# Patient Record
Sex: FEMALE | Race: WHITE | Employment: OTHER | URBAN - METROPOLITAN AREA
[De-identification: names, ages, dates, MRNs, and addresses within clinical notes are randomized per-mention and may not be internally consistent; named-entity substitution may affect disease eponyms.]

---

## 2018-04-18 ENCOUNTER — ANESTHESIA EVENT (OUTPATIENT)
Dept: ENDOSCOPY | Age: 78
End: 2018-04-18
Payer: MEDICARE

## 2018-04-18 ENCOUNTER — HOSPITAL ENCOUNTER (OUTPATIENT)
Age: 78
Setting detail: OUTPATIENT SURGERY
Discharge: HOME | End: 2018-04-18
Attending: INTERNAL MEDICINE | Admitting: INTERNAL MEDICINE
Payer: MEDICARE

## 2018-04-18 ENCOUNTER — ANESTHESIA (OUTPATIENT)
Dept: ENDOSCOPY | Age: 78
End: 2018-04-18
Payer: MEDICARE

## 2018-04-18 VITALS — OXYGEN SATURATION: 99 % | SYSTOLIC BLOOD PRESSURE: 114 MMHG | DIASTOLIC BLOOD PRESSURE: 79 MMHG

## 2018-04-18 VITALS
WEIGHT: 115.13 LBS | RESPIRATION RATE: 16 BRPM | SYSTOLIC BLOOD PRESSURE: 120 MMHG | TEMPERATURE: 96.8 F | HEIGHT: 63 IN | HEART RATE: 63 BPM | DIASTOLIC BLOOD PRESSURE: 70 MMHG | BODY MASS INDEX: 20.4 KG/M2 | OXYGEN SATURATION: 95 %

## 2018-04-18 LAB
APTT: 31.4 SEC (ref 24.5–35.1)
INR BLD: 1
PROTHROMBIN TIME: 11.4 SEC (ref 9.3–12.4)

## 2018-04-18 PROCEDURE — C1773 RET DEV, INSERTABLE: HCPCS | Performed by: INTERNAL MEDICINE

## 2018-04-18 PROCEDURE — 2580000003 HC RX 258: Performed by: ANESTHESIOLOGY

## 2018-04-18 PROCEDURE — 7100000010 HC PHASE II RECOVERY - FIRST 15 MIN: Performed by: INTERNAL MEDICINE

## 2018-04-18 PROCEDURE — 88342 IMHCHEM/IMCYTCHM 1ST ANTB: CPT

## 2018-04-18 PROCEDURE — 36415 COLL VENOUS BLD VENIPUNCTURE: CPT

## 2018-04-18 PROCEDURE — 3700000001 HC ADD 15 MINUTES (ANESTHESIA): Performed by: INTERNAL MEDICINE

## 2018-04-18 PROCEDURE — 7100000011 HC PHASE II RECOVERY - ADDTL 15 MIN: Performed by: INTERNAL MEDICINE

## 2018-04-18 PROCEDURE — 85730 THROMBOPLASTIN TIME PARTIAL: CPT

## 2018-04-18 PROCEDURE — 3609012400 HC EGD TRANSORAL BIOPSY SINGLE/MULTIPLE: Performed by: INTERNAL MEDICINE

## 2018-04-18 PROCEDURE — 85610 PROTHROMBIN TIME: CPT

## 2018-04-18 PROCEDURE — 2709999900 HC NON-CHARGEABLE SUPPLY: Performed by: INTERNAL MEDICINE

## 2018-04-18 PROCEDURE — 88305 TISSUE EXAM BY PATHOLOGIST: CPT

## 2018-04-18 PROCEDURE — 6360000002 HC RX W HCPCS: Performed by: NURSE ANESTHETIST, CERTIFIED REGISTERED

## 2018-04-18 PROCEDURE — 3700000000 HC ANESTHESIA ATTENDED CARE: Performed by: INTERNAL MEDICINE

## 2018-04-18 PROCEDURE — 3609027000 HC COLONOSCOPY: Performed by: INTERNAL MEDICINE

## 2018-04-18 RX ORDER — SODIUM CHLORIDE 0.9 % (FLUSH) 0.9 %
10 SYRINGE (ML) INJECTION PRN
Status: DISCONTINUED | OUTPATIENT
Start: 2018-04-18 | End: 2018-04-18 | Stop reason: HOSPADM

## 2018-04-18 RX ORDER — PROPOFOL 10 MG/ML
INJECTION, EMULSION INTRAVENOUS PRN
Status: DISCONTINUED | OUTPATIENT
Start: 2018-04-18 | End: 2018-04-18 | Stop reason: SDUPTHER

## 2018-04-18 RX ORDER — SODIUM CHLORIDE, SODIUM LACTATE, POTASSIUM CHLORIDE, CALCIUM CHLORIDE 600; 310; 30; 20 MG/100ML; MG/100ML; MG/100ML; MG/100ML
INJECTION, SOLUTION INTRAVENOUS CONTINUOUS
Status: DISCONTINUED | OUTPATIENT
Start: 2018-04-18 | End: 2018-04-18 | Stop reason: HOSPADM

## 2018-04-18 RX ORDER — SODIUM CHLORIDE 0.9 % (FLUSH) 0.9 %
10 SYRINGE (ML) INJECTION EVERY 12 HOURS SCHEDULED
Status: DISCONTINUED | OUTPATIENT
Start: 2018-04-18 | End: 2018-04-18 | Stop reason: HOSPADM

## 2018-04-18 RX ADMIN — SODIUM CHLORIDE, POTASSIUM CHLORIDE, SODIUM LACTATE AND CALCIUM CHLORIDE: 600; 310; 30; 20 INJECTION, SOLUTION INTRAVENOUS at 08:01

## 2018-04-18 RX ADMIN — PROPOFOL 350 MG: 10 INJECTION, EMULSION INTRAVENOUS at 09:36

## 2018-04-18 ASSESSMENT — PAIN - FUNCTIONAL ASSESSMENT: PAIN_FUNCTIONAL_ASSESSMENT: 0-10

## 2018-04-18 ASSESSMENT — PAIN SCALES - GENERAL
PAINLEVEL_OUTOF10: 0
PAINLEVEL_OUTOF10: 0

## 2018-09-06 ENCOUNTER — HOSPITAL ENCOUNTER (OUTPATIENT)
Age: 78
Discharge: HOME | End: 2018-09-08
Payer: MEDICARE

## 2018-09-06 ENCOUNTER — HOSPITAL ENCOUNTER (OUTPATIENT)
Dept: GENERAL RADIOLOGY | Age: 78
Discharge: HOME | End: 2018-09-08
Payer: MEDICARE

## 2018-09-06 DIAGNOSIS — M54.2 NECK PAIN: ICD-10-CM

## 2018-09-06 PROCEDURE — 72050 X-RAY EXAM NECK SPINE 4/5VWS: CPT

## 2019-06-03 NOTE — H&P
[address]                              HISTORY AND PHYSICAL    PATIENT NAME: Yessenia Najera                     :        1940  MED REC NO:   [id number]                            ROOM:  ACCOUNT NO:   [de-identified]                           ADMIT DATE: 2019  PROVIDER:     Marquita Hwang MD    CHIEF COMPLAINT:  Gastroesophageal reflux. HISTORY OF PRESENT ILLNESS:  The patient is an 33-year-old. Presents  with persistent gastroesophageal reflux symptoms, dysphagia on  occasions. ALLERGIES:  SULFA, TYLENOL NO. 3.    HABITS:  Denies. CURRENT MEDICATIONS:  Amlodipine 5 mg daily, lisinopril 10 mg daily,  warfarin 5 mg daily, sertraline 25 mg daily, ibuprofen daily, _____  daily. PAST MEDICAL HISTORY:  Hypertension, remote DVT and pulmonary emboli,  and colorectal cancer. PAST SURGICAL HISTORY:  Colectomy, laparoscopic Nissen fundoplication,  and hysterectomy. PHYSICAL EXAMINATION:  GENERAL:  Alert and oriented. VITAL SIGNS:  Weight 114 pounds, /82, pulse 72 and regular,  respirations 16, temperature of 97.1. HEENT:  Oral cavity is negative. NECK:  Negative. The peripheral lymph nodes nonpalpable. LUNGS:  Clear to auscultation. CARDIOVASCULAR:  Heart sounds are regular. ABDOMEN:  Soft. No palpable masses. BREASTS:  Deferred. RECTAL AND GENITALIA:  Deferred. EXTREMITIES:  Negative. NEUROLOGIC:  Oriented to time and space. No gross deficit. IMPRESSION:  Gastroesophageal reflux status post laparoscopic Nissen  fundoplication. PLAN:  EGD, possible esophageal dilatation.         Subhash Velez MD    D: 2019 7:16:24       T: 2019 9:17:55     [email or web address]  Job#: [id number]     Doc#: [id number]  CC:

## 2019-06-04 ENCOUNTER — HOSPITAL ENCOUNTER (OUTPATIENT)
Dept: MAMMOGRAPHY | Age: 79
Discharge: HOME | End: 2019-06-06
Payer: MEDICARE

## 2019-06-04 DIAGNOSIS — Z12.31 SCREENING MAMMOGRAM, ENCOUNTER FOR: ICD-10-CM

## 2019-06-04 PROCEDURE — 77067 SCR MAMMO BI INCL CAD: CPT

## 2019-06-11 NOTE — PROGRESS NOTES
[facility]                                                                                                                    PRE OP INSTRUCTIONS FOR  Berkley Silver        Date: 6/11/2019    Date of surgery: 6/13/19   Arrival Time: Hospital will call you between 5pm and 7pm with your final arrival time for surgery    1. Do not eat or drink anything after midnight prior to surgery. This includes no water, chewing gum, mints or ice chips. 2. Take the following medications with a small sip of water on the morning of Surgery: amlodipine     3. Diabetics may take evening dose of insulin but none after midnight. If you feel symptomatic or low blood sugar morning of surgery drink 1-2 ounces of apple juice only. 4. Aspirin, Ibuprofen, Advil, Naproxen, Vitamin E and other Anti-inflammatory products should be stopped  before surgery  as directed by your physician. Take Tylenol only unless instructed otherwise by your surgeon. 5. Check with your Doctor regarding stopping Plavix, Coumadin, Lovenox, Eliquis, Effient, or other blood thinners. 6. Do not smoke,use illicit drugs and do not drink any alcoholic beverages 24 hours prior to surgery. 7. You may brush your teeth the morning of surgery. DO NOT SWALLOW WATER    8. You MUST make arrangements for a responsible adult to take you home after your surgery. You will not be allowed to leave alone or drive yourself home. It is strongly suggested someone stay with you the first 24 hrs. Your surgery will be cancelled if you do not have a ride home. 9. PEDIATRIC PATIENTS ONLY:  A parent/legal guardian must accompany a child scheduled for surgery and plan to stay at the hospital until the child is discharged. Please do not bring other children with you.     10. Please wear simple, loose fitting clothing to the hospital.  Ling Gutierrez not bring valuables (money, credit cards, checkbooks, etc.) Do not wear any makeup (including no eye makeup) or nail polish on your fingers or toes. 11. DO NOT wear any jewelry or piercings on day of surgery. All body piercing jewelry must be removed. 12. Shower the night before surgery with _x__Antibacterial soap /JUDSON WIPES________    13. TOTAL JOINT REPLACEMENT/HYSTERECTOMY PATIENTS ONLY---Remember to bring Blood Bank bracelet to the hospital on the day of surgery. 14. If you have a Living Will and Durable Power of  for Healthcare, please bring in a copy. 15. If appropriate bring crutches, inspirex, WALKER, CANE etc... 12. Notify your Surgeon if you develop any illness between now and surgery time, cough, cold, fever, sore throat, nausea, vomiting, etc.  Please notify your surgeon if you experience dizziness, shortness of breath or blurred vision between now & the time of your surgery. 17. If you have ___dentures, they will be removed before going to the OR; we will provide you a container. If you wear ___contact lenses or _x__glasses, they will be removed; please bring a case for them. 18. To provide excellent care visitors will be limited to 2 in the room at any given time. 19. Please bring picture ID and insurance card. 20. Sleep apnea patients need to bring CPAP AND SETTINGS to hospital on day of surgery. 21. During flu season no children under the age of 15 are permitted in the hospital for the safety of all patients. 22. Other                  Please call AMBULATORY CARE if you have any further questions.    [address]     [address]

## 2019-06-13 ENCOUNTER — ANESTHESIA (OUTPATIENT)
Dept: ENDOSCOPY | Age: 79
End: 2019-06-13
Payer: MEDICARE

## 2019-06-13 ENCOUNTER — ANESTHESIA EVENT (OUTPATIENT)
Dept: ENDOSCOPY | Age: 79
End: 2019-06-13
Payer: MEDICARE

## 2019-06-13 ENCOUNTER — HOSPITAL ENCOUNTER (OUTPATIENT)
Age: 79
Setting detail: OUTPATIENT SURGERY
Discharge: HOME | End: 2019-06-13
Attending: INTERNAL MEDICINE | Admitting: INTERNAL MEDICINE
Payer: MEDICARE

## 2019-06-13 VITALS
RESPIRATION RATE: 16 BRPM | OXYGEN SATURATION: 96 % | BODY MASS INDEX: 20.02 KG/M2 | HEIGHT: 63 IN | DIASTOLIC BLOOD PRESSURE: 82 MMHG | SYSTOLIC BLOOD PRESSURE: 146 MMHG | WEIGHT: 113 LBS | TEMPERATURE: 97.9 F | HEART RATE: 60 BPM

## 2019-06-13 VITALS — SYSTOLIC BLOOD PRESSURE: 140 MMHG | OXYGEN SATURATION: 99 % | DIASTOLIC BLOOD PRESSURE: 86 MMHG

## 2019-06-13 LAB
APTT: 36.9 SEC (ref 24.5–35.1)
INR BLD: 1
PROTHROMBIN TIME: 11.7 SEC (ref 9.3–12.4)

## 2019-06-13 PROCEDURE — 3609012400 HC EGD TRANSORAL BIOPSY SINGLE/MULTIPLE: Performed by: INTERNAL MEDICINE

## 2019-06-13 PROCEDURE — 7100000010 HC PHASE II RECOVERY - FIRST 15 MIN: Performed by: INTERNAL MEDICINE

## 2019-06-13 PROCEDURE — 88312 SPECIAL STAINS GROUP 1: CPT

## 2019-06-13 PROCEDURE — 2709999900 HC NON-CHARGEABLE SUPPLY: Performed by: INTERNAL MEDICINE

## 2019-06-13 PROCEDURE — 85730 THROMBOPLASTIN TIME PARTIAL: CPT

## 2019-06-13 PROCEDURE — 85610 PROTHROMBIN TIME: CPT

## 2019-06-13 PROCEDURE — 36415 COLL VENOUS BLD VENIPUNCTURE: CPT

## 2019-06-13 PROCEDURE — 88305 TISSUE EXAM BY PATHOLOGIST: CPT

## 2019-06-13 PROCEDURE — 2580000003 HC RX 258: Performed by: ANESTHESIOLOGY

## 2019-06-13 PROCEDURE — 3700000000 HC ANESTHESIA ATTENDED CARE: Performed by: INTERNAL MEDICINE

## 2019-06-13 PROCEDURE — 6360000002 HC RX W HCPCS

## 2019-06-13 PROCEDURE — 7100000011 HC PHASE II RECOVERY - ADDTL 15 MIN: Performed by: INTERNAL MEDICINE

## 2019-06-13 RX ORDER — 0.9 % SODIUM CHLORIDE 0.9 %
10 VIAL (ML) INJECTION PRN
Status: DISCONTINUED | OUTPATIENT
Start: 2019-06-13 | End: 2019-06-13 | Stop reason: HOSPADM

## 2019-06-13 RX ORDER — SODIUM CHLORIDE 0.9 % (FLUSH) 0.9 %
10 SYRINGE (ML) INJECTION EVERY 12 HOURS SCHEDULED
Status: DISCONTINUED | OUTPATIENT
Start: 2019-06-13 | End: 2019-06-13 | Stop reason: HOSPADM

## 2019-06-13 RX ORDER — SODIUM CHLORIDE, SODIUM LACTATE, POTASSIUM CHLORIDE, CALCIUM CHLORIDE 600; 310; 30; 20 MG/100ML; MG/100ML; MG/100ML; MG/100ML
INJECTION, SOLUTION INTRAVENOUS CONTINUOUS
Status: DISCONTINUED | OUTPATIENT
Start: 2019-06-13 | End: 2019-06-13 | Stop reason: HOSPADM

## 2019-06-13 RX ORDER — PROPOFOL 10 MG/ML
INJECTION, EMULSION INTRAVENOUS PRN
Status: DISCONTINUED | OUTPATIENT
Start: 2019-06-13 | End: 2019-06-13 | Stop reason: SDUPTHER

## 2019-06-13 RX ADMIN — SODIUM CHLORIDE, POTASSIUM CHLORIDE, SODIUM LACTATE AND CALCIUM CHLORIDE: 600; 310; 30; 20 INJECTION, SOLUTION INTRAVENOUS at 10:34

## 2019-06-13 RX ADMIN — SODIUM CHLORIDE, POTASSIUM CHLORIDE, SODIUM LACTATE AND CALCIUM CHLORIDE: 600; 310; 30; 20 INJECTION, SOLUTION INTRAVENOUS at 10:13

## 2019-06-13 RX ADMIN — PROPOFOL 100 MG: 10 INJECTION, EMULSION INTRAVENOUS at 10:44

## 2019-06-13 ASSESSMENT — PAIN SCALES - GENERAL
PAINLEVEL_OUTOF10: 0
PAINLEVEL_OUTOF10: 0

## 2019-06-13 ASSESSMENT — LIFESTYLE VARIABLES: SMOKING_STATUS: 1

## 2019-06-13 ASSESSMENT — PAIN - FUNCTIONAL ASSESSMENT: PAIN_FUNCTIONAL_ASSESSMENT: 0-10

## 2019-06-13 NOTE — ANESTHESIA PRE PROCEDURE
Department of Anesthesiology  Preprocedure Note       Name:  Johnora Officer   Age:  78 y.o.  :  1940                                          MRN:  [id number]         Date:  2019      Surgeon: Oralia Cat):  Blaise aSnchez MD    Procedure: Procedure(s):  EGD ESOPHAGOGASTRODUODENOSCOPY  COLONOSCOPY    Medications prior to admission:   Prior to Admission medications    Medication Sig Start Date End Date Taking? Authorizing Provider   warfarin (COUMADIN) 5 MG tablet Take 5 mg by mouth 16  Yes Historical Provider, MD   lisinopril (PRINIVIL;ZESTRIL) 20 MG tablet Take 1 tablet by mouth daily  Patient taking differently: Take 10 mg by mouth daily  16  Yes Valentina Granados DO   amLODIPine (NORVASC) 10 MG tablet Take 1 tablet by mouth daily  Patient taking differently: Take 5 mg by mouth daily  16  Yes Valentina Granados DO   VITAMIN A Take 2 tablets by mouth daily. Yes Historical Provider, MD       Current medications:    Current Facility-Administered Medications   Medication Dose Route Frequency Provider Last Rate Last Dose    sodium chloride flush 0.9 % injection 10 mL  10 mL Intravenous 2 times per day Blaise Sanchez MD        sodium chloride (PF) 0.9 % injection 10 mL  10 mL Intravenous PRN Blaise Sanchez MD        lactated ringers infusion   Intravenous Continuous Linda Ford MD           Allergies:     Allergies   Allergen Reactions    Acetaminophen-Codeine Itching    Hydrocodone Nausea Only    Sulfa Antibiotics Rash       Problem List:    Patient Active Problem List   Diagnosis Code    Pulmonary embolism (HCC) I26.99    HTN (hypertension) I10    Hyperlipidemia E78.5    GERD (gastroesophageal reflux disease) K21.9    Colon cancer ([facility] [address] C18.9    Hiatal hernia K44.9       Past Medical History:        Diagnosis Date    Anesthesia     with rectal repair had spinal heart stopped    Anesthesia complication     no spinals heart stopped    Arthritis     Cancer ([facility] [address]     colon  COPD (chronic obstructive pulmonary disease) (HCC)     GERD (gastroesophageal reflux disease)     H/O factor V Leiden mutation     on Coumadin    Hiatal hernia     Hx of blood clots     Hx of mammogram     probably last year per patient    Hyperlipidemia     Hypertension     Phlebitis     Pulmonary embolism ([address]        Past Surgical History:        Procedure Laterality Date    ABDOMEN SURGERY      colon resection    APPENDECTOMY      BREAST SURGERY      biopsies on both breasts     COLONOSCOPY  2012    Dr Ollie Jenkins    COLONOSCOPY  10/9/13    COLONOSCOPY  2/19/15    2 polyps ascending colon    ENDOSCOPY, COLON, DIAGNOSTIC      EYE SURGERY      cataracts both eyes    HEMORRHOID SURGERY      HERNIA REPAIR      HYSTERECTOMY      OTHER SURGICAL HISTORY  10/14/2016    laparoscopic paraesophageal hernia repair with fundiplication    IN COLONOSCOPY FLX DX W/COLLJ SPEC WHEN PFRMD N/A 4/18/2018    COLONOSCOPY performed by Dayan Diaz MD at [place] [address]      right shoulder    UPPER GASTROINTESTINAL ENDOSCOPY      Dr. Ollie Jenkins    UPPER GASTROINTESTINAL ENDOSCOPY  02/01/2017    Dr Britney Chopra N/A 4/18/2018    EGD BIOPSY performed by Dayan Diaz MD at [facility]         Social History:    Social History     Tobacco Use    Smoking status: Current Every Day Smoker     Packs/day: 0.25     Years: 50.00     Pack years: 12.50    Smokeless tobacco: Never Used    Tobacco comment: 1 pack per week   Substance Use Topics    Alcohol use:  No                                Ready to quit: Not Answered  Counseling given: Not Answered  Comment: 1 pack per week      Vital Signs (Current):   Vitals:    06/11/19 1428 06/13/19 0945   BP:  134/75   Pulse:  61   Resp:  16   Temp:  98.8 °F (37.1 °C)   TempSrc:  Temporal   SpO2:  97%   Weight: 115 lb (52.2 kg) 113 lb (51.3 kg) Height: 5' 3\" (1.6 m) 5' 3\" (1.6 m)                                              BP Readings from Last 3 Encounters:   06/13/19 134/75   04/18/18 120/70   04/18/18 114/79       NPO Status: Time of last liquid consumption: 2000                        Time of last solid consumption: 2000                        Date of last liquid consumption: 06/12/19                        Date of last solid food consumption: 06/12/19    BMI:   Wt Readings from Last 3 Encounters:   06/13/19 113 lb (51.3 kg)   04/18/18 115 lb 2 oz (52.2 kg)   01/17/18 115 lb (52.2 kg)     Body mass index is 20.02 kg/m². CBC:   Lab Results   Component Value Date    WBC 6.8 01/17/2018    RBC 4.74 01/17/2018    HGB 13.7 01/17/2018    HCT 41.9 01/17/2018    MCV 88.4 01/17/2018    RDW 15.3 01/17/2018     01/17/2018       CMP:   Lab Results   Component Value Date     01/17/2018    K 3.4 01/17/2018     01/17/2018    CO2 33 01/17/2018    BUN 23 01/17/2018    CREATININE 0.8 01/17/2018    GFRAA >60 01/17/2018    LABGLOM >60 01/17/2018    GLUCOSE 49 01/17/2018    PROT 5.2 10/16/2016    CALCIUM 9.8 01/17/2018    BILITOT 0.4 10/16/2016    ALKPHOS 59 10/16/2016    AST 35 10/16/2016    ALT 38 10/16/2016       POC Tests: No results for input(s): POCGLU, POCNA, POCK, POCCL, POCBUN, POCHEMO, POCHCT in the last 72 hours. Coags:   Lab Results   Component Value Date    PROTIME 11.4 04/18/2018    PROTIME 21.1 07/16/2015    INR 1.0 04/18/2018    APTT 31.4 04/18/2018       HCG (If Applicable): No results found for: PREGTESTUR, PREGSERUM, HCG, HCGQUANT     ABGs: No results found for: PHART, PO2ART, ZMN9XJN, RJP7GCV, BEART, J0DWZKAQ     Type & Screen (If Applicable):  No results found for: LABABO, [facility]    Anesthesia Evaluation  Patient summary reviewed   history of anesthetic complications (Cardiac arrest with spinals.): PONV.   Airway: Mallampati: II  TM distance: >3 FB   Neck ROM: full  Mouth opening: > = 3 FB Dental:          Pulmonary: breath sounds clear to auscultation  (+) COPD:  current smoker                          ROS comment: Current Smoker . 25 ppd     Cardiovascular:    (+) hypertension:, hyperlipidemia      ECG reviewed  Rhythm: regular  Rate: normal           Beta Blocker:  Not on Beta Blocker      ROS comment: EKG: Normal Sinus Rhythm 71. Neuro/Psych:               GI/Hepatic/Renal:   (+) hiatal hernia, GERD:,          ROS comment: Cancer Colon. .   Endo/Other:    (+) blood dyscrasia: Factor V and anticoagulation therapy:., malignancy/cancer ( Colon cancer ). Abdominal:         (-) obese     Vascular:   + PE.        ROS comment: H/O Phlebitis. .                                 Anesthesia Plan      MAC     ASA 3       Induction: intravenous. Anesthetic plan and risks discussed with patient and sibling. Plan discussed with CRNA. DOS STAFF ADDENDUM:    Pt seen and examined, chart reviewed (including anesthesia, drug and allergy history). Anesthetic plan, risks, benefits, alternatives, and personnel involved discussed with patient. Patient verbalized an understanding and agrees to proceed. Plan discussed with care team members and agreed upon.     Jenna Dunlap MD  Staff Anesthesiologist  9:59 AM    Jenna Dunlap MD   6/13/2019

## 2019-06-13 NOTE — ANESTHESIA POSTPROCEDURE EVALUATION
Department of Anesthesiology  Postprocedure Note    Patient: Neha Renteria  MRN: [id number]  YOB: 1940  Date of evaluation: 6/13/2019  Time:  1:05 PM     Procedure Summary     Date:  06/13/19 Room / Location:  [place] [address] / [facility]    Anesthesia Start:  1034 Anesthesia Stop:  1568    Procedure:  EGD BIOPSY (N/A ) Diagnosis:  (GERD)    Surgeon:  Lexus Frazier MD Responsible Provider:  Estrella Cates MD    Anesthesia Type:  MAC ASA Status:  3          Anesthesia Type: MAC    Jackie Phase I: Jackie Score: 10    Jackie Phase II: Jackie Score: 10    Last vitals: Reviewed and per EMR flowsheets.        Anesthesia Post Evaluation    Patient location during evaluation: PACU  Patient participation: complete - patient participated  Level of consciousness: awake and alert  Airway patency: patent  Nausea & Vomiting: no nausea and no vomiting  Complications: no  Cardiovascular status: hemodynamically stable  Respiratory status: acceptable  Hydration status: euvolemic

## 2019-06-13 NOTE — OP NOTE
[address]                                OPERATIVE REPORT    PATIENT NAME: Annie Nettles                   :        1940  MED REC NO:   [id number]                            ROOM:  ACCOUNT NO:   [de-identified]                           ADMIT DATE: 2019  PROVIDER:     Mic Argueta MD    DATE OF PROCEDURE:  2019    PREOPERATIVE DIAGNOSIS:  GERD. POSTOPERATIVE DIAGNOSIS:  GERD. OPERATION PERFORMED:  EGD. SURGEON:  Mic Argueta MD    INDICATIONS:  The patient is 64-year-old. Gastroesophageal reflux  symptoms. ASA classification II. Informed consent. SEDATION:  By Anesthesia. OPERATIVE PROCEDURE:  The Olympus video upper endoscope was introduced  through the mouth. The esophagus, stomach, and the proximal duodenum  were inspected. Exam of the fundus by retroflexion. Normal status post  fundoplication. There is columnar epithelium in the lower esophagus (4  cm), biopsy. Endoscope withdrawn.         Renetta Whitmore MD    D: 2019 10:50:38       T: 2019 13:55:46     [email or web address]  Job#: [id number]     Doc#: [id number]    CC:  Michael Gaspar DO

## 2019-06-13 NOTE — H&P
H&p reviewed. No changes. Blood pressure 134/75, pulse 61, temperature 98.8 °F (37.1 °C), temperature source Temporal, resp. rate 16, height 5' 3\" (1.6 m), weight 113 lb (51.3 kg), SpO2 97 %, not currently breastfeeding.

## 2019-07-20 ENCOUNTER — APPOINTMENT (OUTPATIENT)
Dept: GENERAL RADIOLOGY | Age: 79
End: 2019-07-20
Payer: MEDICARE

## 2019-07-20 ENCOUNTER — APPOINTMENT (OUTPATIENT)
Dept: CT IMAGING | Age: 79
End: 2019-07-20
Payer: MEDICARE

## 2019-07-20 ENCOUNTER — HOSPITAL ENCOUNTER (EMERGENCY)
Age: 79
Discharge: HOME | End: 2019-07-20
Attending: EMERGENCY MEDICINE
Payer: MEDICARE

## 2019-07-20 VITALS
RESPIRATION RATE: 16 BRPM | SYSTOLIC BLOOD PRESSURE: 139 MMHG | BODY MASS INDEX: 19.49 KG/M2 | HEART RATE: 101 BPM | TEMPERATURE: 97.7 F | OXYGEN SATURATION: 97 % | WEIGHT: 110 LBS | HEIGHT: 63 IN | DIASTOLIC BLOOD PRESSURE: 89 MMHG

## 2019-07-20 DIAGNOSIS — R79.1 SUBTHERAPEUTIC INTERNATIONAL NORMALIZED RATIO (INR): Primary | ICD-10-CM

## 2019-07-20 DIAGNOSIS — E16.2 HYPOGLYCEMIA: ICD-10-CM

## 2019-07-20 DIAGNOSIS — R11.2 NON-INTRACTABLE VOMITING WITH NAUSEA, UNSPECIFIED VOMITING TYPE: ICD-10-CM

## 2019-07-20 LAB
ANION GAP SERPL CALCULATED.3IONS-SCNC: 11 MMOL/L (ref 7–16)
BASOPHILS ABSOLUTE: 0.08 E9/L (ref 0–0.2)
BASOPHILS RELATIVE PERCENT: 1.1 % (ref 0–2)
BILIRUBIN URINE: NEGATIVE
BLOOD, URINE: NEGATIVE
BUN BLDV-MCNC: 29 MG/DL (ref 8–23)
CALCIUM SERPL-MCNC: 11.1 MG/DL (ref 8.6–10.2)
CHLORIDE BLD-SCNC: 100 MMOL/L (ref 98–107)
CHP ED QC CHECK: YES
CHP ED QC CHECK: YES
CLARITY: CLEAR
CO2: 32 MMOL/L (ref 22–29)
COLOR: YELLOW
CREAT SERPL-MCNC: 1.1 MG/DL (ref 0.5–1)
EOSINOPHILS ABSOLUTE: 0.05 E9/L (ref 0.05–0.5)
EOSINOPHILS RELATIVE PERCENT: 0.7 % (ref 0–6)
GFR AFRICAN AMERICAN: 58
GFR NON-AFRICAN AMERICAN: 48 ML/MIN/1.73
GLUCOSE BLD-MCNC: 195 MG/DL
GLUCOSE BLD-MCNC: 195 MG/DL
GLUCOSE BLD-MCNC: 42 MG/DL (ref 74–99)
GLUCOSE URINE: NEGATIVE MG/DL
HCT VFR BLD CALC: 47.9 % (ref 34–48)
HEMOGLOBIN: 15.5 G/DL (ref 11.5–15.5)
IMMATURE GRANULOCYTES #: 0.01 E9/L
IMMATURE GRANULOCYTES %: 0.1 % (ref 0–5)
INR BLD: 1
KETONES, URINE: NEGATIVE MG/DL
LEUKOCYTE ESTERASE, URINE: NEGATIVE
LYMPHOCYTES ABSOLUTE: 1.33 E9/L (ref 1.5–4)
LYMPHOCYTES RELATIVE PERCENT: 18.8 % (ref 20–42)
MCH RBC QN AUTO: 29.8 PG (ref 26–35)
MCHC RBC AUTO-ENTMCNC: 32.4 % (ref 32–34.5)
MCV RBC AUTO: 91.9 FL (ref 80–99.9)
METER GLUCOSE: 195 MG/DL (ref 74–99)
MONOCYTES ABSOLUTE: 0.71 E9/L (ref 0.1–0.95)
MONOCYTES RELATIVE PERCENT: 10 % (ref 2–12)
NEUTROPHILS ABSOLUTE: 4.91 E9/L (ref 1.8–7.3)
NEUTROPHILS RELATIVE PERCENT: 69.3 % (ref 43–80)
NITRITE, URINE: NEGATIVE
PDW BLD-RTO: 14.6 FL (ref 11.5–15)
PH UA: 6 (ref 5–9)
PLATELET # BLD: 219 E9/L (ref 130–450)
PMV BLD AUTO: 11 FL (ref 7–12)
POTASSIUM SERPL-SCNC: 4.2 MMOL/L (ref 3.5–5)
PROTEIN UA: NEGATIVE MG/DL
PROTHROMBIN TIME: 11.5 SEC (ref 9.3–12.4)
RBC # BLD: 5.21 E12/L (ref 3.5–5.5)
SODIUM BLD-SCNC: 143 MMOL/L (ref 132–146)
SPECIFIC GRAVITY UA: 1.01 (ref 1–1.03)
TROPONIN: <0.01 NG/ML (ref 0–0.03)
UROBILINOGEN, URINE: 0.2 E.U./DL
WBC # BLD: 7.1 E9/L (ref 4.5–11.5)

## 2019-07-20 PROCEDURE — 71046 X-RAY EXAM CHEST 2 VIEWS: CPT

## 2019-07-20 PROCEDURE — 70450 CT HEAD/BRAIN W/O DYE: CPT

## 2019-07-20 PROCEDURE — 84484 ASSAY OF TROPONIN QUANT: CPT

## 2019-07-20 PROCEDURE — 81003 URINALYSIS AUTO W/O SCOPE: CPT

## 2019-07-20 PROCEDURE — 93005 ELECTROCARDIOGRAM TRACING: CPT | Performed by: PHYSICIAN ASSISTANT

## 2019-07-20 PROCEDURE — 85610 PROTHROMBIN TIME: CPT

## 2019-07-20 PROCEDURE — 82962 GLUCOSE BLOOD TEST: CPT

## 2019-07-20 PROCEDURE — 85025 COMPLETE CBC W/AUTO DIFF WBC: CPT

## 2019-07-20 PROCEDURE — 80048 BASIC METABOLIC PNL TOTAL CA: CPT

## 2019-07-20 PROCEDURE — 99284 EMERGENCY DEPT VISIT MOD MDM: CPT

## 2019-07-20 PROCEDURE — 36415 COLL VENOUS BLD VENIPUNCTURE: CPT

## 2019-07-20 RX ORDER — PREDNISONE 20 MG/1
60 TABLET ORAL ONCE
Status: DISCONTINUED | OUTPATIENT
Start: 2019-07-20 | End: 2019-07-20

## 2019-07-20 NOTE — ED PROVIDER NOTES
ED Attending  CC: No        HPI:  7/20/19,   Time: 11:45 AM         Emily Layne is a 78 y.o. female presenting to the ED for HA, nausea and request for PT/INR, beginning 2 days ago. The complaint has been persistent, mild in severity, and worsened by nothing. The patient presents today with several complaints. She states that she is mostly concerned about her PT/INR level. She states that on last check in early July it was a little bit low. She reports that they have been making adjustments in her dosage. She admits that she has been using ibuprofen off and on because of complaints of arthritis as well as a headache. She states that she had a severe headache 2 days ago that began out of the blue. There was no fall or trauma. She states that it is a little bit better now and at this point she has minimal pain. Patient denies any weakness in her arms or legs. She has intermittent dry heaves that she states have been an issue for years. She states that these began after she had surgery by Dr. Jasen Song several years ago. She admits that she does not even think she told him but she has discussed this with her family physician. The patient states that she takes Nexium or omeprazole as needed. The patient also told the triage nurse that she was having some fatigue and lightheadedness. In general she just has not felt well over the past few days. Patient denies any chest pain or shortness of breath.   She has had no emesis this morning and denies any abdominal pain or urinary symptoms        ROS:     Constitutional: Negative for fever and chills  HENT: Negative for ear pain, sore throat and sinus pressure  Eyes: Negative for pain, discharge and redness  Respiratory:  Negative for shortness of breath, cough and wheezing  Cardiovascular: Negative for CP, edema or palpitations  Gastrointestinal:  See HPI  Genitourinary: Negative for dysuria and frequency  Musculoskeletal: See HPI  Skin: Negative for rash WBC 7.1 4.5 - 11.5 E9/L    RBC 5.21 3.50 - 5.50 E12/L    Hemoglobin 15.5 11.5 - 15.5 g/dL    Hematocrit 47.9 34.0 - 48.0 %    MCV 91.9 80.0 - 99.9 fL    MCH 29.8 26.0 - 35.0 pg    MCHC 32.4 32.0 - 34.5 %    RDW 14.6 11.5 - 15.0 fL    Platelets 404 977 - 648 E9/L    MPV 11.0 7.0 - 12.0 fL    Neutrophils % 69.3 43.0 - 80.0 %    Immature Granulocytes % 0.1 0.0 - 5.0 %    Lymphocytes % 18.8 (L) 20.0 - 42.0 %    Monocytes % 10.0 2.0 - 12.0 %    Eosinophils % 0.7 0.0 - 6.0 %    Basophils % 1.1 0.0 - 2.0 %    Neutrophils # 4.91 1.80 - 7.30 E9/L    Immature Granulocytes # 0.01 E9/L    Lymphocytes # 1.33 (L) 1.50 - 4.00 E9/L    Monocytes # 0.71 0.10 - 0.95 E9/L    Eosinophils # 0.05 0.05 - 0.50 E9/L    Basophils # 0.08 0.00 - 0.20 M4/S   Basic Metabolic Panel   Result Value Ref Range    Sodium 143 132 - 146 mmol/L    Potassium 4.2 3.5 - 5.0 mmol/L    Chloride 100 98 - 107 mmol/L    CO2 32 (H) 22 - 29 mmol/L    Anion Gap 11 7 - 16 mmol/L    Glucose 42 (L) 74 - 99 mg/dL    BUN 29 (H) 8 - 23 mg/dL    CREATININE 1.1 (H) 0.5 - 1.0 mg/dL    GFR Non-African American 48 >=60 mL/min/1.73    GFR African American 58     Calcium 11.1 (H) 8.6 - 10.2 mg/dL   Protime-INR   Result Value Ref Range    Protime 11.5 9.3 - 12.4 sec    INR 1.0    Troponin   Result Value Ref Range    Troponin <0.01 0.00 - 0.03 ng/mL   Urinalysis   Result Value Ref Range    Color, UA Yellow Straw/Yellow    Clarity, UA Clear Clear    Glucose, Ur Negative Negative mg/dL    Bilirubin Urine Negative Negative    Ketones, Urine Negative Negative mg/dL    Specific Gravity, UA 1.015 1.005 - 1.030    Blood, Urine Negative Negative    pH, UA 6.0 5.0 - 9.0    Protein, UA Negative Negative mg/dL    Urobilinogen, Urine 0.2 <2.0 E.U./dL    Nitrite, Urine Negative Negative    Leukocyte Esterase, Urine Negative Negative   EKG 12 Lead   Result Value Ref Range    Ventricular Rate 105 BPM    Atrial Rate 105 BPM    P-R Interval 168 ms    QRS Duration 76 ms    Q-T Interval 344 ms QTc Calculation (Bazett) 454 ms    P Axis 59 degrees    R Axis -41 degrees    T Axis 71 degrees       RADIOLOGY:  Interpreted by Radiologist.  CT Head WO Contrast   Final Result      1. No acute findings. XR CHEST STANDARD (2 VW)   Final Result   1. No active cardiopulmonary disease.           ----------------- NURSING NOTES AND VITALS REVIEWED ---------------   The nursing notes within the ED encounter and vital signs as below have been reviewed. BP (!) 154/101   Pulse 82   Temp 97.7 °F (36.5 °C) (Oral)   Resp 14   Ht 5' 3\" (1.6 m)   Wt 110 lb (49.9 kg)   LMP  (LMP Unknown)   SpO2 97%   BMI 19.49 kg/m²   Oxygen Saturation Interpretation: Normal      --------------------------------PHYSICAL EXAM------------------------------------      Constitutional/General: Alert and oriented x3, well appearing, non toxic in NAD  Head: NC/AT  Eyes: PERRL, EOMI  Mouth: Oropharynx clear, handling secretions, no trismus  Neck: Supple, full ROM, no meningeal signs  Pulmonary: Lungs clear to auscultation bilaterally, no wheezes, rales, or rhonchi. Not in respiratory distress  Cardiovascular:  Regular rate and rhythm, no murmurs, gallops, or rubs. 2+ distal pulses  Abdomen: Soft, + BS. No distension. Nontender. No palpable rigidity, rebound or guarding  Extremities: Moves all extremities x 4. Warm and well perfused  Skin: warm and dry without rash  Neurologic: GCS 15,  Intact. No focal deficits  Psych: Normal Affect      ------------------------ ED COURSE/MEDICAL DECISION MAKING----------------------  Medications - No data to display      Medical Decision Making:    Work-up looks good. Her BS was low and we fed her here. INR subtherapeutic. She met with pharmacist and insisted she is taking her Coumadin daily as directed. She had broccoli several times this week. Reviewed proper warfarin diet restrictions. Plan discharge home with instructions to F/U as planned at [facility].   Seen and evaluated with

## 2019-07-21 LAB
EKG ATRIAL RATE: 105 BPM
EKG P AXIS: 59 DEGREES
EKG P-R INTERVAL: 168 MS
EKG Q-T INTERVAL: 344 MS
EKG QRS DURATION: 76 MS
EKG QTC CALCULATION (BAZETT): 454 MS
EKG R AXIS: -41 DEGREES
EKG T AXIS: 71 DEGREES
EKG VENTRICULAR RATE: 105 BPM

## 2019-07-21 PROCEDURE — 93010 ELECTROCARDIOGRAM REPORT: CPT | Performed by: INTERNAL MEDICINE

## 2020-12-14 NOTE — H&P
[address]                              HISTORY AND PHYSICAL    PATIENT NAME: Joslyn Mccormack                   :        1940  MED REC NO:   [id number]                            ROOM:  ACCOUNT NO:   [de-identified]                           ADMIT DATE: 2020  PROVIDER:     Rolando Alicia MD    CHIEF COMPLAINT:  Screening colonoscopy, dyspepsia. HISTORY OF PRESENT ILLNESS:  The patient is an 61-year-old. Screening  colonoscopy. Seven periodic loose stools. She had dyspeptic symptoms. ALLERGIES:  SULFA, TYLENOL NO. 3.    HABITS:  Denies. CURRENT MEDICATIONS:  Lisinopril 10 mg daily, amlodipine 5 mg daily,  Nexium 40 mg daily, multivitamin. PAST MEDICAL HISTORY:  Hypertension, colorectal cancer, remote DVT with  pulmonary embolism. PAST SURGICAL HISTORY:  Colectomy, laparoscopic Nissen, hysterectomy. PHYSICAL EXAMINATION:  GENERAL:  Alert and oriented. VITAL SIGNS:  Weight 118 pounds. /80, pulse 72, respirations 16,  temperature 98. 2. HEENT:  Oral cavity negative. NECK:  Negative. The peripheral lymph nodes are not palpable. LUNGS:  Clear to auscultation. BREASTS:  Deferred. HEART:  Sounds regular. ABDOMEN:  Soft. No palpable masses. RECTAL:  Negative. GENITALIA:  Deferred. EXTREMITIES:  Negative. NEUROLOGIC:  Oriented to time and space. No gross deficit. IMPRESSION:  1.  Screening colonoscopy. 2.  Dyspeptic symptoms. PLAN:  Colonoscopy, EGD.         Froylan Busby MD    D: 2020 13:55:06       T: 2020 14:01:42     [email or web address]  Job#: [id number]     Doc#: [id number]    CC:

## 2020-12-17 ENCOUNTER — HOSPITAL ENCOUNTER (OUTPATIENT)
Age: 80
Discharge: HOME | End: 2020-12-17
Payer: MEDICARE

## 2020-12-17 PROCEDURE — U0003 INFECTIOUS AGENT DETECTION BY NUCLEIC ACID (DNA OR RNA); SEVERE ACUTE RESPIRATORY SYNDROME CORONAVIRUS 2 (SARS-COV-2) (CORONAVIRUS DISEASE [COVID-19]), AMPLIFIED PROBE TECHNIQUE, MAKING USE OF HIGH THROUGHPUT TECHNOLOGIES AS DESCRIBED BY CMS-2020-01-R: HCPCS

## 2020-12-19 LAB
SARS-COV-2: NOT DETECTED
SOURCE: NORMAL

## 2020-12-22 RX ORDER — ESOMEPRAZOLE MAGNESIUM 20 MG/1
20 TABLET, DELAYED RELEASE ORAL 2 TIMES DAILY
COMMUNITY

## 2020-12-22 NOTE — PROGRESS NOTES
[facility]                                                                                                                    PRE OP INSTRUCTIONS FOR  Kofi Kaminski        Date: 12/22/2020    Date of surgery: 12/23/2020   Arrival Time: Hospital will call you between 5pm and 7pm with your final arrival time for surgery    1. Do not eat or drink anything after midnight prior to surgery. This includes no water, chewing gum, mints or ice chips. 2. Take the following medications with a small sip of water on the morning of Surgery: amlodipine     3. Diabetics may take evening dose of insulin but none after midnight. If you feel symptomatic or low blood sugar morning of surgery drink 1-2 ounces of apple juice only. 4. Aspirin, Ibuprofen, Advil, Naproxen, Vitamin E and other Anti-inflammatory products should be stopped  before surgery  as directed by your physician. Take Tylenol only unless instructed otherwise by your surgeon. 5. Check with your Doctor regarding stopping Plavix, Coumadin, Lovenox, Eliquis, Effient, or other blood thinners. 6. Do not smoke,use illicit drugs and do not drink any alcoholic beverages 24 hours prior to surgery. 7. You may brush your teeth the morning of surgery. DO NOT SWALLOW WATER    8. You MUST make arrangements for a responsible adult to take you home after your surgery. You will not be allowed to leave alone or drive yourself home. It is strongly suggested someone stay with you the first 24 hrs. Your surgery will be cancelled if you do not have a ride home. 9. PEDIATRIC PATIENTS ONLY:  A parent/legal guardian must accompany a child scheduled for surgery and plan to stay at the hospital until the child is discharged. Please do not bring other children with you.     10. Please wear simple, loose fitting clothing to the hospital.  [place] not bring valuables (money, credit cards, checkbooks, etc.) Do not wear any makeup (including no eye makeup) or nail polish on your fingers or toes. 11. DO NOT wear any jewelry or piercings on day of surgery. All body piercing jewelry must be removed. 12. Shower the night before surgery with ___Antibacterial soap /JUDSON WIPES________    13. TOTAL JOINT REPLACEMENT/HYSTERECTOMY PATIENTS ONLY---Remember to bring Blood Bank bracelet to the hospital on the day of surgery. 14. If you have a Living Will and Durable Power of  for Healthcare, please bring in a copy. 15. If appropriate bring crutches, inspirex, WALKER, CANE etc... 12. Notify your Surgeon if you develop any illness between now and surgery time, cough, cold, fever, sore throat, nausea, vomiting, etc.  Please notify your surgeon if you experience dizziness, shortness of breath or blurred vision between now & the time of your surgery. 17. If you have ___dentures, they will be removed before going to the OR; we will provide you a container. If you wear ___contact lenses or ___glasses, they will be removed; please bring a case for them. 18. To provide excellent care visitors will be limited to 2 in the room at any given time. 19. Please bring picture ID and insurance card. 20. Sleep apnea patients need to bring CPAP AND SETTINGS to hospital on day of surgery. 21. During flu season no children under the age of 15 are permitted in the hospital for the safety of all patients. 22. The day of your procedure please report to the main lobby information desk and you will be directed where to go. Please call AMBULATORY CARE if you have any further questions.    [address]     [address]

## 2020-12-23 ENCOUNTER — ANESTHESIA (OUTPATIENT)
Dept: ENDOSCOPY | Age: 80
End: 2020-12-23
Payer: MEDICARE

## 2020-12-23 ENCOUNTER — HOSPITAL ENCOUNTER (OUTPATIENT)
Age: 80
Setting detail: OUTPATIENT SURGERY
Discharge: HOME | End: 2020-12-23
Attending: INTERNAL MEDICINE | Admitting: INTERNAL MEDICINE
Payer: MEDICARE

## 2020-12-23 ENCOUNTER — ANESTHESIA EVENT (OUTPATIENT)
Dept: ENDOSCOPY | Age: 80
End: 2020-12-23
Payer: MEDICARE

## 2020-12-23 VITALS
SYSTOLIC BLOOD PRESSURE: 135 MMHG | WEIGHT: 112.31 LBS | OXYGEN SATURATION: 97 % | HEIGHT: 63 IN | TEMPERATURE: 97.9 F | HEART RATE: 60 BPM | DIASTOLIC BLOOD PRESSURE: 69 MMHG | RESPIRATION RATE: 16 BRPM | BODY MASS INDEX: 19.9 KG/M2

## 2020-12-23 VITALS
RESPIRATION RATE: 16 BRPM | DIASTOLIC BLOOD PRESSURE: 77 MMHG | OXYGEN SATURATION: 98 % | SYSTOLIC BLOOD PRESSURE: 107 MMHG

## 2020-12-23 PROCEDURE — 2580000003 HC RX 258: Performed by: ANESTHESIOLOGY

## 2020-12-23 PROCEDURE — 2500000003 HC RX 250 WO HCPCS: Performed by: ANESTHESIOLOGY

## 2020-12-23 PROCEDURE — 88305 TISSUE EXAM BY PATHOLOGIST: CPT

## 2020-12-23 PROCEDURE — 2709999900 HC NON-CHARGEABLE SUPPLY: Performed by: INTERNAL MEDICINE

## 2020-12-23 PROCEDURE — 7100000010 HC PHASE II RECOVERY - FIRST 15 MIN: Performed by: INTERNAL MEDICINE

## 2020-12-23 PROCEDURE — 3700000000 HC ANESTHESIA ATTENDED CARE: Performed by: INTERNAL MEDICINE

## 2020-12-23 PROCEDURE — 3609010400 HC COLONOSCOPY POLYPECTOMY HOT BIOPSY: Performed by: INTERNAL MEDICINE

## 2020-12-23 PROCEDURE — 3700000001 HC ADD 15 MINUTES (ANESTHESIA): Performed by: INTERNAL MEDICINE

## 2020-12-23 PROCEDURE — 6360000002 HC RX W HCPCS: Performed by: NURSE ANESTHETIST, CERTIFIED REGISTERED

## 2020-12-23 PROCEDURE — 3609012400 HC EGD TRANSORAL BIOPSY SINGLE/MULTIPLE: Performed by: INTERNAL MEDICINE

## 2020-12-23 PROCEDURE — 7100000011 HC PHASE II RECOVERY - ADDTL 15 MIN: Performed by: INTERNAL MEDICINE

## 2020-12-23 RX ORDER — SODIUM CHLORIDE 0.9 % (FLUSH) 0.9 %
10 SYRINGE (ML) INJECTION PRN
Status: DISCONTINUED | OUTPATIENT
Start: 2020-12-23 | End: 2020-12-23 | Stop reason: HOSPADM

## 2020-12-23 RX ORDER — SODIUM CHLORIDE, SODIUM LACTATE, POTASSIUM CHLORIDE, CALCIUM CHLORIDE 600; 310; 30; 20 MG/100ML; MG/100ML; MG/100ML; MG/100ML
INJECTION, SOLUTION INTRAVENOUS CONTINUOUS
Status: DISCONTINUED | OUTPATIENT
Start: 2020-12-23 | End: 2020-12-23 | Stop reason: HOSPADM

## 2020-12-23 RX ORDER — HYDROCODONE BITARTRATE AND ACETAMINOPHEN 5; 325 MG/1; MG/1
1 TABLET ORAL
Status: DISCONTINUED | OUTPATIENT
Start: 2020-12-23 | End: 2020-12-23 | Stop reason: HOSPADM

## 2020-12-23 RX ORDER — PROPOFOL 10 MG/ML
INJECTION, EMULSION INTRAVENOUS PRN
Status: DISCONTINUED | OUTPATIENT
Start: 2020-12-23 | End: 2020-12-23 | Stop reason: SDUPTHER

## 2020-12-23 RX ORDER — SODIUM CHLORIDE 0.9 % (FLUSH) 0.9 %
10 SYRINGE (ML) INJECTION EVERY 12 HOURS SCHEDULED
Status: DISCONTINUED | OUTPATIENT
Start: 2020-12-23 | End: 2020-12-23 | Stop reason: HOSPADM

## 2020-12-23 RX ADMIN — FAMOTIDINE 20 MG: 10 INJECTION INTRAVENOUS at 08:40

## 2020-12-23 RX ADMIN — PROPOFOL 25 MG: 10 INJECTION, EMULSION INTRAVENOUS at 09:24

## 2020-12-23 RX ADMIN — PROPOFOL 25 MG: 10 INJECTION, EMULSION INTRAVENOUS at 09:34

## 2020-12-23 RX ADMIN — PROPOFOL 25 MG: 10 INJECTION, EMULSION INTRAVENOUS at 09:17

## 2020-12-23 RX ADMIN — PROPOFOL 25 MG: 10 INJECTION, EMULSION INTRAVENOUS at 08:55

## 2020-12-23 RX ADMIN — PROPOFOL 25 MG: 10 INJECTION, EMULSION INTRAVENOUS at 09:10

## 2020-12-23 RX ADMIN — PROPOFOL 25 MG: 10 INJECTION, EMULSION INTRAVENOUS at 08:59

## 2020-12-23 RX ADMIN — PROPOFOL 25 MG: 10 INJECTION, EMULSION INTRAVENOUS at 08:56

## 2020-12-23 RX ADMIN — PROPOFOL 25 MG: 10 INJECTION, EMULSION INTRAVENOUS at 08:54

## 2020-12-23 RX ADMIN — SODIUM CHLORIDE, POTASSIUM CHLORIDE, SODIUM LACTATE AND CALCIUM CHLORIDE: 600; 310; 30; 20 INJECTION, SOLUTION INTRAVENOUS at 08:20

## 2020-12-23 RX ADMIN — PROPOFOL 25 MG: 10 INJECTION, EMULSION INTRAVENOUS at 09:37

## 2020-12-23 RX ADMIN — PROPOFOL 25 MG: 10 INJECTION, EMULSION INTRAVENOUS at 09:00

## 2020-12-23 RX ADMIN — PROPOFOL 25 MG: 10 INJECTION, EMULSION INTRAVENOUS at 09:27

## 2020-12-23 RX ADMIN — PROPOFOL 25 MG: 10 INJECTION, EMULSION INTRAVENOUS at 09:15

## 2020-12-23 RX ADMIN — PROPOFOL 25 MG: 10 INJECTION, EMULSION INTRAVENOUS at 09:12

## 2020-12-23 RX ADMIN — PROPOFOL 25 MG: 10 INJECTION, EMULSION INTRAVENOUS at 09:02

## 2020-12-23 RX ADMIN — PROPOFOL 25 MG: 10 INJECTION, EMULSION INTRAVENOUS at 09:06

## 2020-12-23 RX ADMIN — PROPOFOL 25 MG: 10 INJECTION, EMULSION INTRAVENOUS at 09:08

## 2020-12-23 RX ADMIN — PROPOFOL 25 MG: 10 INJECTION, EMULSION INTRAVENOUS at 09:21

## 2020-12-23 RX ADMIN — PROPOFOL 25 MG: 10 INJECTION, EMULSION INTRAVENOUS at 09:04

## 2020-12-23 RX ADMIN — PROPOFOL 25 MG: 10 INJECTION, EMULSION INTRAVENOUS at 09:31

## 2020-12-23 ASSESSMENT — COPD QUESTIONNAIRES: CAT_SEVERITY: MILD

## 2020-12-23 ASSESSMENT — PAIN SCALES - GENERAL
PAINLEVEL_OUTOF10: 0
PAINLEVEL_OUTOF10: 0

## 2020-12-23 ASSESSMENT — PAIN - FUNCTIONAL ASSESSMENT: PAIN_FUNCTIONAL_ASSESSMENT: 0-10

## 2020-12-23 NOTE — H&P
H&p reviewed. No changes. Blood pressure 134/69, pulse 69, temperature 97.9 °F (36.6 °C), temperature source Infrared, resp. rate 16, height 5' 3\" (1.6 m), weight 112 lb 5 oz (50.9 kg), SpO2 97 %, not currently breastfeeding. The patient was counseled at length about the risks of sol Covid-19 during their perioperative period and any recovery window from their procedure. The patient was made aware that sol Covid-19  may worsen their prognosis for recovering from their procedure  and lend to a higher morbidity and/or mortality risk. All material risks, benefits, and reasonable alternatives including postponing the procedure were discussed. The patient does wish to proceed with the procedure at this time.

## 2020-12-23 NOTE — ANESTHESIA POSTPROCEDURE EVALUATION
Department of Anesthesiology  Postprocedure Note    Patient: Benjamin Jorgensen  MRN: [id number]  YOB: 1940  Date of evaluation: 12/23/2020  Time:  10:03 AM     Procedure Summary     Date: 12/23/20 Room / Location: [address] [place] [address] 01 / [address] [place] [address]    Anesthesia Start: 0855 Anesthesia Stop: 4777    Procedures:       EGD BIOPSY (N/A )      COLONOSCOPY POLYPECTOMY HOT BIOPSY (N/A ) Diagnosis: (SCREENING, GERD)    Surgeons: Mary Anne Wolfe MD Responsible Provider: Lj Muller DO    Anesthesia Type: MAC ASA Status: 3          Anesthesia Type: MAC    Jackie Phase I: Jackie Score: 10    Jackie Phase II:      Last vitals: Reviewed and per EMR flowsheets.        Anesthesia Post Evaluation    Patient location during evaluation: bedside  Patient participation: complete - patient participated  Level of consciousness: awake  Pain score: 1  Airway patency: patent  Nausea & Vomiting: no vomiting and no nausea  Complications: no  Cardiovascular status: hemodynamically stable  Respiratory status: acceptable  Hydration status: stable

## 2020-12-23 NOTE — OP NOTE
[address]                                OPERATIVE REPORT    PATIENT NAME: Ej Franco                   :        1940  MED REC NO:   [id number]                            ROOM:  ACCOUNT NO:   [de-identified]                           ADMIT DATE: 2020  PROVIDER:     Nai Baker MD    DATE OF PROCEDURE:  2020    SURGEON:  Nai Baker MD    PREOPERATIVE DIAGNOSES:  Screening, GERD. POSTOPERATIVE DIAGNOSES:  Colonic polyps, GERD. OPERATION PERFORMED:  Colonoscopy, EGD. INDICATIONS:  The patient is an 68-year-old. Screening colonoscopy. Personal history of colonic cancer. She has reflux symptoms. 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 SEDATION:  By Anesthesia. DIGITAL RECTAL EXAM:  Anatomic. OPERATIVE PROCEDURE:  The Olympus video colonoscope was introduced  through the anus. It was advanced gently until the cecum. The prep was  good. The mucosa was normal.  Status post sigmoid colectomy. Two  polyps in the hepatic flexure area of the colon, excised with a biopsy  forceps and with the endoscopic snare and electrocoagulation. Exam of  the anal area by retroflexion. Withdrawal time 8 minutes. Estimated  blood loss none. Recommended surveillance interval in five years. The Olympus operative video endoscope was introduced through the mouth. The esophagus, the stomach, and the proximal duodenum were inspected  (white light and NBI). Exam of the gastric fundus by retroflexion. Hiatus hernia, columnar epithelium in the lower esophagus, biopsies. Endoscope withdrawn.         Eunice Ross MD    D: 2020 9:49:26       T: 2020 11:11:25     [email or web address]  Job#: [id number]     Doc#: [id number]    CC:       _____

## 2020-12-23 NOTE — ANESTHESIA PRE PROCEDURE
Department of Anesthesiology  Preprocedure Note       Name:  Samson Keene   Age:  78 y.o.  :  1940                                          MRN:  [id number]         Date:  2020      Surgeon: Angle Lynn):  Miki Borden MD    Procedure: Procedure(s):  EGD ESOPHAGOGASTRODUODENOSCOPY AND COLONOSCOPY  COLONOSCOPY    Medications prior to admission:   Prior to Admission medications    Medication Sig Start Date End Date Taking? Authorizing Provider   esomeprazole Magnesium (NEXIUM) 20 MG PACK Take 20 mg by mouth daily   Yes Historical Provider, MD   lisinopril (PRINIVIL;ZESTRIL) 20 MG tablet Take 1 tablet by mouth daily  Patient taking differently: Take 10 mg by mouth daily  16  Yes Lethea Burner, DO   amLODIPine (NORVASC) 10 MG tablet Take 1 tablet by mouth daily  Patient taking differently: Take 5 mg by mouth daily  16  Yes Lethea Burncara, DO   VITAMIN A Take 2 tablets by mouth daily. Yes Historical Provider, MD       Current medications:    No current facility-administered medications for this encounter. Current Outpatient Medications   Medication Sig Dispense Refill    esomeprazole Magnesium (NEXIUM) 20 MG PACK Take 20 mg by mouth daily      lisinopril (PRINIVIL;ZESTRIL) 20 MG tablet Take 1 tablet by mouth daily (Patient taking differently: Take 10 mg by mouth daily ) 90 tablet 3    amLODIPine (NORVASC) 10 MG tablet Take 1 tablet by mouth daily (Patient taking differently: Take 5 mg by mouth daily ) 90 tablet 3    VITAMIN A Take 2 tablets by mouth daily. Allergies:     Allergies   Allergen Reactions    Acetaminophen-Codeine Itching    Hydrocodone Nausea Only    Sulfa Antibiotics Rash       Problem List:    Patient Active Problem List   Diagnosis Code    Pulmonary embolism (HCC) I26.99    HTN (hypertension) I10    Hyperlipidemia E78.5    GERD (gastroesophageal reflux disease) K21.9    Colon cancer ([facility] [address] C18.9    Hiatal hernia K44.9       Past Medical History: Diagnosis Date    Anesthesia     with rectal repair had spinal heart stopped    Anesthesia complication     no spinals heart stopped    Arthritis     Cancer ([facility] [address] 1994    colon    COPD (chronic obstructive pulmonary disease) (HCC)     GERD (gastroesophageal reflux disease)     H/O factor V Leiden mutation     on Coumadin    Hiatal hernia     Hx of blood clots     Hx of mammogram     probably last year per patient    Hyperlipidemia     Hypertension     Phlebitis     Pulmonary embolism ([facility] [address]        Past Surgical History:        Procedure Laterality Date    ABDOMEN SURGERY      colon resection    APPENDECTOMY      BREAST SURGERY      biopsies on both breasts     COLONOSCOPY  2012    Dr Chase Silvestre    COLONOSCOPY  10/9/13    COLONOSCOPY  2/19/15    2 polyps ascending colon    ENDOSCOPY, COLON, DIAGNOSTIC      EYE SURGERY      cataracts both eyes    HEMORRHOID SURGERY      HERNIA REPAIR      HYSTERECTOMY      OTHER SURGICAL HISTORY  10/14/2016    laparoscopic paraesophageal hernia repair with fundiplication    MA COLONOSCOPY FLX DX W/COLLJ SPEC WHEN PFRMD N/A 4/18/2018    COLONOSCOPY performed by Suzie Pickard MD at [facility]      right shoulder   Juanetta Filler      Dr. Chase Silvestre    UPPER GASTROINTESTINAL ENDOSCOPY  02/01/2017    Dr Boni Aldridge N/A 4/18/2018    EGD BIOPSY [facility] N/A 6/13/2019    EGD BIOPSY performed by Suzie Pickard MD at [facility]         Social History:    Social History     Tobacco Use    Smoking status: Current Every Day Smoker     Packs/day: 0.50     Years: 50.00     Pack years: 25.00    Smokeless tobacco: Never Used    Tobacco comment: 1 pack per week   Substance Use Topics    Alcohol use:  No Ready to quit: Not Answered  Counseling given: Not Answered  Comment: 1 pack per week      Vital Signs (Current):   Vitals:    12/22/20 0919   Weight: 118 lb (53.5 kg)   Height: 5' 3\" (1.6 m)                                              BP Readings from Last 3 Encounters:   07/20/19 139/89   06/13/19 (!) 146/82   06/13/19 (!) 140/86       NPO Status:                                                                                 BMI:   Wt Readings from Last 3 Encounters:   07/20/19 110 lb (49.9 kg)   06/13/19 113 lb (51.3 kg)   04/18/18 115 lb 2 oz (52.2 kg)     Body mass index is 20.9 kg/m². CBC:   Lab Results   Component Value Date    WBC 7.1 07/20/2019    RBC 5.21 07/20/2019    HGB 15.5 07/20/2019    HCT 47.9 07/20/2019    MCV 91.9 07/20/2019    RDW 14.6 07/20/2019     07/20/2019       CMP:   Lab Results   Component Value Date     07/20/2019    K 4.2 07/20/2019     07/20/2019    CO2 32 07/20/2019    BUN 29 07/20/2019    CREATININE 1.1 07/20/2019    GFRAA 58 07/20/2019    LABGLOM 48 07/20/2019    GLUCOSE 195 07/20/2019    PROT 5.2 10/16/2016    CALCIUM 11.1 07/20/2019    BILITOT 0.4 10/16/2016    ALKPHOS 59 10/16/2016    AST 35 10/16/2016    ALT 38 10/16/2016       POC Tests: No results for input(s): POCGLU, POCNA, POCK, POCCL, POCBUN, POCHEMO, POCHCT in the last 72 hours.     Coags:   Lab Results   Component Value Date    PROTIME 11.5 07/20/2019    PROTIME 21.1 07/16/2015    INR 1.0 07/20/2019    APTT 36.9 06/13/2019       HCG (If Applicable): No results found for: PREGTESTUR, PREGSERUM, HCG, HCGQUANT     ABGs: No results found for: PHART, PO2ART, MLE3GDD, LLJ7WEG, BEART, H2RUJADI     Type & Screen (If Applicable):  No results found for: LABABO, LABRH    Drug/Infectious Status (If Applicable):  No results found for: HIV, HEPCAB    COVID-19 Screening (If Applicable):   Lab Results   Component Value Date    COVID19 Not Detected 12/17/2020 Anesthesia Evaluation  Patient summary reviewed and Nursing notes reviewed history of anesthetic complications (Asystole with spinal anesthesia): Airway: Mallampati: III  TM distance: >3 FB   Neck ROM: full  Mouth opening: > = 3 FB Dental:          Pulmonary:   (+) COPD (25+ pyhx.): mild,                            PE comment: Decreased breath sounds bilaterally, prolonged expiratory phase, rales at the bases. Cardiovascular:  Exercise tolerance: good (>4 METS),   (+) hypertension: mild, murmur (Grade 2 murmur), hyperlipidemia      ECG reviewed  Rhythm: regular  Rate: normal           Beta Blocker:  Not on Beta Blocker      ROS comment: Sinus tachycardia  Left axis deviation  Inferior infarct , age undetermined  Anterior infarct , age undetermined  Abnormal ECG  When compared with ECG of 17-JAN-2018 13:58,  Anterior infarct is now present  Inferior infarct is now present  Confirmed by Narcisa Hogan ([id number]) on 7/21/2019 9:17:04 AM    Denies anginal symptoms, SOB, JACKMAN, physical restrictions, or any recent changes in functional capacity and is at baseline. Neuro/Psych:               GI/Hepatic/Renal:   (+) hiatal hernia, GERD: well controlled,          ROS comment: Hx. Of hiatal hernia repair, ? Nissen fundoplication, with residual symptomatic GERD. Endo/Other:    (+) malignancy/cancer (Hx. of colon cancer). Abdominal:           Vascular:   + DVT, PE.        ROS comment: Factor V Leiden mutation. Anesthesia Plan      MAC     ASA 3       Induction: intravenous. MIPS: Prophylactic antiemetics administered. Anesthetic plan and risks discussed with patient. Plan discussed with CRNA.                   Armando Fleming,    12/23/2020

## 2021-09-22 ENCOUNTER — APPOINTMENT (OUTPATIENT)
Dept: GENERAL RADIOLOGY | Age: 81
DRG: 312 | End: 2021-09-22
Payer: MEDICARE

## 2021-09-22 ENCOUNTER — HOSPITAL ENCOUNTER (INPATIENT)
Age: 81
LOS: 2 days | Discharge: HOME | DRG: 312 | End: 2021-09-24
Attending: EMERGENCY MEDICINE | Admitting: FAMILY MEDICINE
Payer: MEDICARE

## 2021-09-22 ENCOUNTER — APPOINTMENT (OUTPATIENT)
Dept: CT IMAGING | Age: 81
DRG: 312 | End: 2021-09-22
Payer: MEDICARE

## 2021-09-22 DIAGNOSIS — R55 SYNCOPE AND COLLAPSE: Primary | ICD-10-CM

## 2021-09-22 DIAGNOSIS — I10 ESSENTIAL HYPERTENSION: ICD-10-CM

## 2021-09-22 LAB
ALBUMIN SERPL-MCNC: 3.8 G/DL (ref 3.5–5.2)
ALP BLD-CCNC: 127 U/L (ref 35–104)
ALT SERPL-CCNC: 9 U/L (ref 0–32)
ANION GAP SERPL CALCULATED.3IONS-SCNC: 7 MMOL/L (ref 7–16)
AST SERPL-CCNC: 18 U/L (ref 0–31)
BACTERIA: ABNORMAL /HPF
BASOPHILS ABSOLUTE: 0.08 E9/L (ref 0–0.2)
BASOPHILS RELATIVE PERCENT: 0.5 % (ref 0–2)
BILIRUB SERPL-MCNC: 0.3 MG/DL (ref 0–1.2)
BILIRUBIN URINE: NEGATIVE
BLOOD, URINE: NEGATIVE
BUN BLDV-MCNC: 33 MG/DL (ref 6–23)
CALCIUM SERPL-MCNC: 10.1 MG/DL (ref 8.6–10.2)
CHLORIDE BLD-SCNC: 102 MMOL/L (ref 98–107)
CLARITY: CLEAR
CO2: 28 MMOL/L (ref 22–29)
COLOR: YELLOW
CREAT SERPL-MCNC: 1.1 MG/DL (ref 0.5–1)
CRYSTALS, UA: ABNORMAL /HPF
EKG ATRIAL RATE: 59 BPM
EKG P AXIS: 35 DEGREES
EKG P-R INTERVAL: 162 MS
EKG Q-T INTERVAL: 464 MS
EKG QRS DURATION: 86 MS
EKG QTC CALCULATION (BAZETT): 459 MS
EKG R AXIS: -24 DEGREES
EKG T AXIS: 55 DEGREES
EKG VENTRICULAR RATE: 59 BPM
EOSINOPHILS ABSOLUTE: 0.1 E9/L (ref 0.05–0.5)
EOSINOPHILS RELATIVE PERCENT: 0.7 % (ref 0–6)
GFR AFRICAN AMERICAN: 57
GFR NON-AFRICAN AMERICAN: 47 ML/MIN/1.73
GLUCOSE BLD-MCNC: 174 MG/DL (ref 74–99)
GLUCOSE URINE: NEGATIVE MG/DL
HCT VFR BLD CALC: 44.4 % (ref 34–48)
HEMOGLOBIN: 14.2 G/DL (ref 11.5–15.5)
IMMATURE GRANULOCYTES #: 0.13 E9/L
IMMATURE GRANULOCYTES %: 0.9 % (ref 0–5)
KETONES, URINE: ABNORMAL MG/DL
LACTIC ACID: 1.3 MMOL/L (ref 0.5–2.2)
LEUKOCYTE ESTERASE, URINE: NEGATIVE
LYMPHOCYTES ABSOLUTE: 0.96 E9/L (ref 1.5–4)
LYMPHOCYTES RELATIVE PERCENT: 6.3 % (ref 20–42)
MCH RBC QN AUTO: 29 PG (ref 26–35)
MCHC RBC AUTO-ENTMCNC: 32 % (ref 32–34.5)
MCV RBC AUTO: 90.6 FL (ref 80–99.9)
MONOCYTES ABSOLUTE: 0.7 E9/L (ref 0.1–0.95)
MONOCYTES RELATIVE PERCENT: 4.6 % (ref 2–12)
NEUTROPHILS ABSOLUTE: 13.2 E9/L (ref 1.8–7.3)
NEUTROPHILS RELATIVE PERCENT: 87 % (ref 43–80)
NITRITE, URINE: NEGATIVE
PDW BLD-RTO: 15.2 FL (ref 11.5–15)
PH UA: 5.5 (ref 5–9)
PLATELET # BLD: 208 E9/L (ref 130–450)
PMV BLD AUTO: 10.4 FL (ref 7–12)
POTASSIUM SERPL-SCNC: 4.6 MMOL/L (ref 3.5–5)
PROTEIN UA: 100 MG/DL
RBC # BLD: 4.9 E12/L (ref 3.5–5.5)
RBC UA: ABNORMAL /HPF (ref 0–2)
SARS-COV-2, NAAT: NOT DETECTED
SODIUM BLD-SCNC: 137 MMOL/L (ref 132–146)
SPECIFIC GRAVITY UA: >=1.03 (ref 1–1.03)
TOTAL PROTEIN: 5.8 G/DL (ref 6.4–8.3)
TROPONIN, HIGH SENSITIVITY: 9 NG/L (ref 0–9)
TROPONIN, HIGH SENSITIVITY: 9 NG/L (ref 0–9)
TSH SERPL DL<=0.05 MIU/L-ACNC: 0.8 UIU/ML (ref 0.27–4.2)
UROBILINOGEN, URINE: 0.2 E.U./DL
WBC # BLD: 15.2 E9/L (ref 4.5–11.5)
WBC UA: ABNORMAL /HPF (ref 0–5)

## 2021-09-22 PROCEDURE — 2060000000 HC ICU INTERMEDIATE R&B

## 2021-09-22 PROCEDURE — 72125 CT NECK SPINE W/O DYE: CPT

## 2021-09-22 PROCEDURE — 83605 ASSAY OF LACTIC ACID: CPT

## 2021-09-22 PROCEDURE — 2580000003 HC RX 258: Performed by: FAMILY MEDICINE

## 2021-09-22 PROCEDURE — 93005 ELECTROCARDIOGRAM TRACING: CPT | Performed by: EMERGENCY MEDICINE

## 2021-09-22 PROCEDURE — 80053 COMPREHEN METABOLIC PANEL: CPT

## 2021-09-22 PROCEDURE — 36415 COLL VENOUS BLD VENIPUNCTURE: CPT

## 2021-09-22 PROCEDURE — 84484 ASSAY OF TROPONIN QUANT: CPT

## 2021-09-22 PROCEDURE — 2580000003 HC RX 258: Performed by: EMERGENCY MEDICINE

## 2021-09-22 PROCEDURE — 96361 HYDRATE IV INFUSION ADD-ON: CPT

## 2021-09-22 PROCEDURE — 85025 COMPLETE CBC W/AUTO DIFF WBC: CPT

## 2021-09-22 PROCEDURE — 71045 X-RAY EXAM CHEST 1 VIEW: CPT

## 2021-09-22 PROCEDURE — 6370000000 HC RX 637 (ALT 250 FOR IP): Performed by: FAMILY MEDICINE

## 2021-09-22 PROCEDURE — 96360 HYDRATION IV INFUSION INIT: CPT

## 2021-09-22 PROCEDURE — 87635 SARS-COV-2 COVID-19 AMP PRB: CPT

## 2021-09-22 PROCEDURE — 81001 URINALYSIS AUTO W/SCOPE: CPT

## 2021-09-22 PROCEDURE — 99283 EMERGENCY DEPT VISIT LOW MDM: CPT

## 2021-09-22 PROCEDURE — 84443 ASSAY THYROID STIM HORMONE: CPT

## 2021-09-22 PROCEDURE — G0378 HOSPITAL OBSERVATION PER HR: HCPCS

## 2021-09-22 PROCEDURE — 70450 CT HEAD/BRAIN W/O DYE: CPT

## 2021-09-22 RX ORDER — SODIUM CHLORIDE 9 MG/ML
INJECTION, SOLUTION INTRAVENOUS CONTINUOUS
Status: DISCONTINUED | OUTPATIENT
Start: 2021-09-22 | End: 2021-09-22

## 2021-09-22 RX ORDER — SODIUM CHLORIDE 9 MG/ML
25 INJECTION, SOLUTION INTRAVENOUS PRN
Status: DISCONTINUED | OUTPATIENT
Start: 2021-09-22 | End: 2021-09-24 | Stop reason: HOSPADM

## 2021-09-22 RX ORDER — SODIUM CHLORIDE 0.9 % (FLUSH) 0.9 %
10 SYRINGE (ML) INJECTION PRN
Status: DISCONTINUED | OUTPATIENT
Start: 2021-09-22 | End: 2021-09-24 | Stop reason: HOSPADM

## 2021-09-22 RX ORDER — LISINOPRIL 10 MG/1
10 TABLET ORAL DAILY
COMMUNITY

## 2021-09-22 RX ORDER — ONDANSETRON 2 MG/ML
4 INJECTION INTRAMUSCULAR; INTRAVENOUS EVERY 6 HOURS PRN
Status: DISCONTINUED | OUTPATIENT
Start: 2021-09-22 | End: 2021-09-24 | Stop reason: HOSPADM

## 2021-09-22 RX ORDER — PANTOPRAZOLE SODIUM 40 MG/1
40 TABLET, DELAYED RELEASE ORAL
Status: DISCONTINUED | OUTPATIENT
Start: 2021-09-22 | End: 2021-09-24 | Stop reason: HOSPADM

## 2021-09-22 RX ORDER — SODIUM CHLORIDE 0.9 % (FLUSH) 0.9 %
10 SYRINGE (ML) INJECTION EVERY 12 HOURS SCHEDULED
Status: DISCONTINUED | OUTPATIENT
Start: 2021-09-22 | End: 2021-09-24 | Stop reason: HOSPADM

## 2021-09-22 RX ORDER — AMLODIPINE BESYLATE 10 MG/1
10 TABLET ORAL DAILY
Status: ON HOLD | COMMUNITY
End: 2021-09-24 | Stop reason: HOSPADM

## 2021-09-22 RX ORDER — PROMETHAZINE HYDROCHLORIDE 25 MG/1
12.5 TABLET ORAL EVERY 6 HOURS PRN
Status: DISCONTINUED | OUTPATIENT
Start: 2021-09-22 | End: 2021-09-24 | Stop reason: HOSPADM

## 2021-09-22 RX ORDER — ACETAMINOPHEN 325 MG/1
650 TABLET ORAL EVERY 6 HOURS PRN
Status: DISCONTINUED | OUTPATIENT
Start: 2021-09-22 | End: 2021-09-24 | Stop reason: HOSPADM

## 2021-09-22 RX ORDER — ACETAMINOPHEN 650 MG/1
650 SUPPOSITORY RECTAL EVERY 6 HOURS PRN
Status: DISCONTINUED | OUTPATIENT
Start: 2021-09-22 | End: 2021-09-24 | Stop reason: HOSPADM

## 2021-09-22 RX ORDER — LISINOPRIL 10 MG/1
10 TABLET ORAL DAILY
Status: DISCONTINUED | OUTPATIENT
Start: 2021-09-22 | End: 2021-09-24 | Stop reason: HOSPADM

## 2021-09-22 RX ORDER — POLYETHYLENE GLYCOL 3350 17 G/17G
17 POWDER, FOR SOLUTION ORAL DAILY PRN
Status: DISCONTINUED | OUTPATIENT
Start: 2021-09-22 | End: 2021-09-24 | Stop reason: HOSPADM

## 2021-09-22 RX ORDER — LISINOPRIL 10 MG/1
10 TABLET ORAL ONCE
Status: COMPLETED | OUTPATIENT
Start: 2021-09-22 | End: 2021-09-22

## 2021-09-22 RX ORDER — AMLODIPINE BESYLATE 5 MG/1
5 TABLET ORAL DAILY
Status: DISCONTINUED | OUTPATIENT
Start: 2021-09-22 | End: 2021-09-24 | Stop reason: HOSPADM

## 2021-09-22 RX ORDER — SODIUM CHLORIDE 9 MG/ML
INJECTION, SOLUTION INTRAVENOUS CONTINUOUS
Status: DISCONTINUED | OUTPATIENT
Start: 2021-09-22 | End: 2021-09-23

## 2021-09-22 RX ORDER — PANTOPRAZOLE SODIUM 40 MG/1
40 TABLET, DELAYED RELEASE ORAL DAILY
Status: DISCONTINUED | OUTPATIENT
Start: 2021-09-22 | End: 2021-09-22

## 2021-09-22 RX ADMIN — LISINOPRIL 10 MG: 10 TABLET ORAL at 20:59

## 2021-09-22 RX ADMIN — SODIUM CHLORIDE: 9 INJECTION, SOLUTION INTRAVENOUS at 22:58

## 2021-09-22 RX ADMIN — Medication 10 ML: at 20:59

## 2021-09-22 RX ADMIN — SODIUM CHLORIDE: 9 INJECTION, SOLUTION INTRAVENOUS at 17:03

## 2021-09-22 RX ADMIN — PANTOPRAZOLE SODIUM 40 MG: 40 TABLET, DELAYED RELEASE ORAL at 20:59

## 2021-09-22 RX ADMIN — SODIUM CHLORIDE: 9 INJECTION, SOLUTION INTRAVENOUS at 02:16

## 2021-09-22 RX ADMIN — SODIUM CHLORIDE: 9 INJECTION, SOLUTION INTRAVENOUS at 07:51

## 2021-09-22 ASSESSMENT — PAIN DESCRIPTION - FREQUENCY: FREQUENCY: CONTINUOUS

## 2021-09-22 ASSESSMENT — PAIN DESCRIPTION - DESCRIPTORS: DESCRIPTORS: DISCOMFORT

## 2021-09-22 ASSESSMENT — PAIN DESCRIPTION - PAIN TYPE: TYPE: ACUTE PAIN

## 2021-09-22 ASSESSMENT — PAIN SCALES - GENERAL: PAINLEVEL_OUTOF10: 0

## 2021-09-22 ASSESSMENT — PAIN DESCRIPTION - LOCATION: LOCATION: ABDOMEN

## 2021-09-22 NOTE — ED NOTES
Pt ambulated to [facility]. States that she had soft BM and is still experiencing \"gas pains\". No tenderness noted on palpation of abdomen.       Cristofer Hanson RN  09/22/21 [id number]

## 2021-09-22 NOTE — H&P
Hospitalist History & Physical      PCP: Lindsey Hayes DO    Date of Admission: 9/22/2021    Date of Service: Pt seen/examined on 9/22/2021     Chief Complaint:  had concerns including Loss of Consciousness (pt had snycopal episode going to the restroom. pt does not remember incidence. pt found by family. pt states she was having abdominal pain prior to incidence. ). History Of Present Illness:    Ms. Gonsalo Castro, a 78y.o. year old female  who  has a past medical history of Anesthesia, Anesthesia complication, Arthritis, Cancer ([address] COPD (chronic obstructive pulmonary disease) ([address] GERD (gastroesophageal reflux disease), H/O factor V Leiden mutation, Hiatal hernia, Hx of blood clots, Hx of mammogram, Hyperlipidemia, Hypertension, Phlebitis, and Pulmonary embolism ([address] Patient presented to the emergency department after having a syncopal event. Patient was walking to the bathroom became nauseated and can't remember anything after that. Patient reporting loose stools. Denies chest pain, shortness of breath, cough, fever, chills. Vital signs assessed in emergency department within normal limits and stable. Laboratory studies notable for BUN 33, creatinine 1.1, glucose 174, alk phos 127, WBC 15.2. CT head was unremarkable. CT C-spine was unremarkable with the exception of 5 mm right apical pleural-parenchymal nodule.       Past Medical History:   Diagnosis Date    Anesthesia     with rectal repair had spinal heart stopped    Anesthesia complication     no spinals heart stopped    Arthritis     Cancer ([address] 1994    colon    COPD (chronic obstructive pulmonary disease) (HCC)     GERD (gastroesophageal reflux disease)     H/O factor V Leiden mutation     on Coumadin    Hiatal hernia     Hx of blood clots     Hx of mammogram     probably last year per patient    Hyperlipidemia     Hypertension     Phlebitis     Pulmonary embolism ([address]        Past Surgical History:   Procedure Laterality Hydrocodone    Social History:    TOBACCO:   reports that she has been smoking. She has a 25.00 pack-year smoking history. She has never used smokeless tobacco.  ETOH:   reports no history of alcohol use. Family History:    Reviewed in detail and negative for DM, CAD, Cancer, CVA. Positive as follows\"      Problem Relation Age of Onset    Heart Disease Mother     Cancer Father         prostate    Other Brother         macular degeneration       REVIEW OF SYSTEMS:   Pertinent positives as noted in the HPI. All other systems reviewed and negative. PHYSICAL EXAM:  /79   Pulse 65   Temp 97.5 °F (36.4 °C)   Resp 16   Ht 5' 3\" (1.6 m)   Wt 112 lb (50.8 kg)   LMP  (LMP Unknown)   SpO2 99%   BMI 19.84 kg/m²   General appearance: No acute distress. HEENT: Normal cephalic. Posterior scalp hematoma  Neck: Supple, with full range of motion. No jugular venous distention. Trachea midline. Respiratory: Clear to auscultation bilaterally  Cardiovascular: Regular rate and rhythm  Abdomen: Soft, nontender, nondistended  Musculoskeletal: No clubbing, cyanosis, edema of bilateral lower extremities. Brisk capillary refill. Skin: Normal skin color. No rashes or lesions. Neurologic: Alert and oriented to person and place but not time. Cranial nerves II to XII grossly intact      CBC:   Recent Labs     09/22/21  0146   WBC 15.2*   RBC 4.90   HGB 14.2   HCT 44.4   MCV 90.6   RDW 15.2*        BMP:   Recent Labs     09/22/21 0146      K 4.6      CO2 28   BUN 33*   CREATININE 1.1*     LFT:  Recent Labs     09/22/21 0146   PROT 5.8*   ALKPHOS 127*   ALT 9   AST 18   BILITOT 0.3     CE:  No results for input(s): Ran Altes in the last 72 hours. PT/INR: No results for input(s): INR, APTT in the last 72 hours. BNP: No results for input(s): BNP in the last 72 hours.   ESR:   Lab Results   Component Value Date    SEDRATE 3 10/04/2013     CRP: No results found for: CRP  D Dimer:   Lab Results Component Value Date    DDIMER <200 10/09/2014      Folate and B12: No results found for: [id number], No results found for: FOLATE  Lactic Acid:   Lab Results   Component Value Date    LACTA 1.3 09/22/2021     Thyroid Studies:   Lab Results   Component Value Date    TSH 1.430 09/16/2016    K2BURED 133.10 09/16/2016    M4KKXRO 8.5 09/16/2016       Oupatient labs:  Lab Results   Component Value Date    CHOL 271 (H) 09/16/2016    TRIG 268 (H) 09/16/2016    HDL 58 09/16/2016    LDLCALC 159 (H) 09/16/2016    TSH 1.430 09/16/2016    INR 1.0 07/20/2019    LABA1C 5.6 09/16/2016       Urinalysis:    Lab Results   Component Value Date    NITRU Negative 09/22/2021    BLOODU Negative 09/22/2021    SPECGRAV >=1.030 09/22/2021    GLUCOSEU Negative 09/22/2021       Imaging:  CT HEAD WO CONTRAST    Result Date: 9/22/2021  EXAMINATION: CT OF THE HEAD WITHOUT CONTRAST  9/22/2021 3:52 am TECHNIQUE: CT of the head was performed without the administration of intravenous contrast. Dose modulation, iterative reconstruction, and/or weight based adjustment of the mA/kV was utilized to reduce the radiation dose to as low as reasonably achievable. COMPARISON: 07/20/2019 unenhanced head CT. HISTORY: ORDERING SYSTEM PROVIDED HISTORY: Evaluate intracranial abnormality TECHNOLOGIST PROVIDED HISTORY: Has a \"code stroke\" or \"stroke alert\" been called? ->No Reason for exam:->Evaluate intracranial abnormality Decision Support Exception - unselect if not a suspected or confirmed emergency medical condition->Emergency Medical Condition (MA) What reading provider will be dictating this exam?->CRC FINDINGS: BRAIN/VENTRICLES: There is no acute intracranial hemorrhage, mass effect or midline shift. No abnormal extra-axial fluid collection. The gray-white differentiation is maintained without evidence of an acute infarct. There is no evidence of hydrocephalus. There is senescent cerebral and cerebellar atrophy.   Patchy bilateral periventricular white matter hypodensity is noted, with extension into the subcortical white matter, consistent with relatively advanced chronic small vessel ischemic change. ORBITS: The visualized portion of the orbits demonstrate no acute abnormality. Native ocular lenses are not seen, compatible with prior bilateral cataract surgery. SINUSES: The visualized paranasal sinuses and mastoid air cells demonstrate no acute abnormality. SOFT TISSUES/SKULL:  No basilar skull nor calvarial fracture. Right parietal scalp contusion. No acute intracranial abnormality. No basilar skull nor calvarial fracture. Right parietal scalp contusion. CT CERVICAL SPINE WO CONTRAST    Result Date: 9/22/2021  EXAMINATION: CT OF THE CERVICAL SPINE WITHOUT CONTRAST 9/22/2021 3:52 am TECHNIQUE: CT of the cervical spine was performed without the administration of intravenous contrast. Multiplanar reformatted images are provided for review. Dose modulation, iterative reconstruction, and/or weight based adjustment of the mA/kV was utilized to reduce the radiation dose to as low as reasonably achievable. COMPARISON: None. HISTORY: ORDERING SYSTEM PROVIDED HISTORY: fall syncope confused TECHNOLOGIST PROVIDED HISTORY: Reason for exam:->fall syncope confused Decision Support Exception - unselect if not a suspected or confirmed emergency medical condition->Emergency Medical Condition (MA) What reading provider will be dictating this exam?->CRC FINDINGS: BONES/ALIGNMENT: There is no acute fracture or traumatic malalignment. There is exaggeration of the normal lower cervical lordosis in part due to loss of posterior disc height at C5-C6 and C6-C7. DEGENERATIVE CHANGES: There is atlantoaxial spurring which nearly abuts the basion. There is mild multilevel osteophytosis of the lower cervical spine.  No spinal canal stenosis or gerber osseous neural foraminal narrowing, with mild relative neural foraminal narrowing noted on the left at C5-C6 and on the right at C6-C7.  Multilevel facet arthropathy bilaterally. SOFT TISSUES: No prevertebral soft tissue swelling. Bilateral carotid bulb calcifications noted. 9 mm hypodense nodule within the left thyroid lobe, with patchy heterogeneity also seen within the inferior right thyroid lobe. On image 80 of the axial series, there is the peer Karthik of spiculated 4.5 mm nodular opacity, however this demonstrates relatively flat morphology on coronal imaging and is favored to represent nodular scarring. No acute abnormality of the cervical spine. 5 mm right apical pleuroparenchymal nodule, as discussed. See recommendation below. Subcentimeter left thyroid lobe nodule. See recommendation below. RECOMMENDATIONS: 5 mm suspicious solid pulmonary nodule. If patient is low risk for malignancy, no routine follow-up imaging is recommended; if patient is high risk for malignancy, a non-contrast Chest CT at 12 months is optional. If performed and the nodule is stable at 12 months, no further follow-up is recommended. These guidelines do not apply to immunocompromised patients and patients with cancer. Follow up in patients with significant comorbidities as clinically warranted. For lung cancer screening, adhere to Lung-RADS guidelines. Reference: Radiology. 2017; 284(1):228-43. Subcentimeter incidental thyroid nodule. No follow-up imaging is recommended. Reference: J Am Piyush Radiol. 2015 Feb;12(2): 143-50     XR CHEST PORTABLE    Result Date: 9/22/2021  EXAMINATION: ONE XRAY VIEW OF THE CHEST 9/22/2021 1:49 am COMPARISON: None. HISTORY: ORDERING SYSTEM PROVIDED HISTORY: syncope FINDINGS: Heart size is normal. No focal consolidation in the lungs. No pleural effusion or pneumothorax. No acute abnormality.        ASSESSMENT:  -Syncope and collapse  -Altered mental status  -Leukocytosis, likely reactive  -CKD stage III  -5 mm right apical pleural-parenchymal nodule  -Hypertension      PLAN:  -Admit to medicine  -Echocardiogram pending  -Orthostatic vitals  -Telemetry  -Physical therapy  -Repeat chest CT in 12 months  -Resume home medications        Diet: No diet orders on file  Code Status: Prior  Surrogate decision maker confirmed with patient:   Extended Emergency Contact Information  Primary Emergency Contact: Corey Eisenberg Phone: [phone]  Relation: Brother/Sister  Preferred language: English   needed? No  Secondary Emergency Contact: [address] [facility] Phone: [phone]  Relation: Child    DVT Prophylaxis: []Lovenox []Heparin []PCD [] [address] []Encouraged ambulation  Disposition: []Med/Surg [] Intermediate [] ICU/CCU  Admit status: [] Observation [] Inpatient     +++++++++++++++++++++++++++++++++++++++++++++++++  Alban Hashimoto, DO  [address]  +++++++++++++++++++++++++++++++++++++++++++++++++  NOTE: This report was transcribed using voice recognition software. Every effort was made to ensure accuracy; however, inadvertent computerized transcription errors may be present.

## 2021-09-22 NOTE — ED PROVIDER NOTES
HPI:  9/22/21, Time: 1:31 AM EDT         Saurabh Sosa is a 78 y.o. female presenting to the ED for syncopal event, beginning short time ago. The complaint has been persistent, moderate in severity, and worsened by nothing. Patient reportedly was walking to bathroom and was nauseated and cannot recall anything afterwards. Patient oriented to person place but not to time. Patient reporting loose stools. Patient reporting no productive cough she reports no chest pains or shortness of breath she does have history of COPD she does have a history of PE. Patient also has history of hypertension and hyperlipidemia there is no history of hematemesis or black or tarry stools. Patient reporting no numbness or tingling. Patient oriented to person and place but not to time    ROS:   Pertinent positives and negatives are stated within HPI, all other systems reviewed and are negative.  --------------------------------------------- PAST HISTORY ---------------------------------------------  Past Medical History:  has a past medical history of Anesthesia, Anesthesia complication, Arthritis, Cancer ([facility] COPD (chronic obstructive pulmonary disease) [facility] GERD (gastroesophageal reflux disease), H/O factor V Leiden mutation, Hiatal hernia, Hx of blood clots, Hx of mammogram, Hyperlipidemia, Hypertension, Phlebitis, and Pulmonary embolism ([facility] Past Surgical History:  has a past surgical history that includes Hysterectomy; Vein Surgery; Hemorrhoid surgery; Upper gastrointestinal endoscopy; Rectal prolapse repair; Endoscopy, colon, diagnostic; vascular surgery; Breast surgery; Rotator cuff repair; Appendectomy; Colonoscopy (2012); Colonoscopy (10/9/13); Colonoscopy (2/19/15); Abdomen surgery; other surgical history (10/14/2016); Upper gastrointestinal endoscopy (02/01/2017); hernia repair; eye surgery; pr colonoscopy flx dx w/collj spec when pfrmd (N/A, 4/18/2018);  Upper gastrointestinal endoscopy (N/A, 4/18/2018); listed below.      LABS:  Results for orders placed or performed during the hospital encounter of 09/22/21   COVID-19, Rapid    Specimen: Nasopharyngeal Swab   Result Value Ref Range    SARS-CoV-2, NAAT Not Detected Not Detected   CBC auto differential   Result Value Ref Range    WBC 15.2 (H) 4.5 - 11.5 E9/L    RBC 4.90 3.50 - 5.50 E12/L    Hemoglobin 14.2 11.5 - 15.5 g/dL    Hematocrit 44.4 34.0 - 48.0 %    MCV 90.6 80.0 - 99.9 fL    MCH 29.0 26.0 - 35.0 pg    MCHC 32.0 32.0 - 34.5 %    RDW 15.2 (H) 11.5 - 15.0 fL    Platelets 295 839 - 059 E9/L    MPV 10.4 7.0 - 12.0 fL    Neutrophils % 87.0 (H) 43.0 - 80.0 %    Immature Granulocytes % 0.9 0.0 - 5.0 %    Lymphocytes % 6.3 (L) 20.0 - 42.0 %    Monocytes % 4.6 2.0 - 12.0 %    Eosinophils % 0.7 0.0 - 6.0 %    Basophils % 0.5 0.0 - 2.0 %    Neutrophils Absolute 13.20 (H) 1.80 - 7.30 E9/L    Immature Granulocytes # 0.13 E9/L    Lymphocytes Absolute 0.96 (L) 1.50 - 4.00 E9/L    Monocytes Absolute 0.70 0.10 - 0.95 E9/L    Eosinophils Absolute 0.10 0.05 - 0.50 E9/L    Basophils Absolute 0.08 0.00 - 0.20 E9/L   Comprehensive Metabolic Panel   Result Value Ref Range    Sodium 137 132 - 146 mmol/L    Potassium 4.6 3.5 - 5.0 mmol/L    Chloride 102 98 - 107 mmol/L    CO2 28 22 - 29 mmol/L    Anion Gap 7 7 - 16 mmol/L    Glucose 174 (H) 74 - 99 mg/dL    BUN 33 (H) 6 - 23 mg/dL    CREATININE 1.1 (H) 0.5 - 1.0 mg/dL    GFR Non-African American 47 >=60 mL/min/1.73    GFR African American 57     Calcium 10.1 8.6 - 10.2 mg/dL    Total Protein 5.8 (L) 6.4 - 8.3 g/dL    Albumin 3.8 3.5 - 5.2 g/dL    Total Bilirubin 0.3 0.0 - 1.2 mg/dL    Alkaline Phosphatase 127 (H) 35 - 104 U/L    ALT 9 0 - 32 U/L    AST 18 0 - 31 U/L   Troponin   Result Value Ref Range    Troponin, High Sensitivity 9 0 - 9 ng/L   Lactic Acid, Plasma   Result Value Ref Range    Lactic Acid 1.3 0.5 - 2.2 mmol/L       RADIOLOGY:  Interpreted by Radiologist.  CT HEAD WO CONTRAST   Final Result   No acute intracranial abnormality. No basilar skull nor calvarial fracture. Right parietal scalp contusion. CT CERVICAL SPINE WO CONTRAST   Final Result   No acute abnormality of the cervical spine. 5 mm right apical pleuroparenchymal nodule, as discussed. See recommendation   below. Subcentimeter left thyroid lobe nodule. See recommendation below. RECOMMENDATIONS:   5 mm suspicious solid pulmonary nodule. If patient is low risk for   malignancy, no routine follow-up imaging is recommended; if patient is high   risk for malignancy, a non-contrast Chest CT at 12 months is optional. If   performed and the nodule is stable at 12 months, no further follow-up is   recommended. These guidelines do not apply to immunocompromised patients and patients with   cancer. Follow up in patients with significant comorbidities as clinically   warranted. For lung cancer screening, adhere to Lung-RADS guidelines. Reference: Radiology. 2017; 284(1):228-43. Subcentimeter incidental thyroid nodule. No follow-up imaging is recommended. Reference: J Am Piyush Radiol. 2015 Feb;12(2): 143-50         XR CHEST PORTABLE   Final Result   No acute abnormality. EKG: This EKG is signed and interpreted by me. Rate: 59  Rhythm: Sinus bradycardia  Interpretation: non-specific EKG  Comparison: stable as compared to patient's most recent EKG and no previous EKG available          ------------------------- NURSING NOTES AND VITALS REVIEWED ---------------------------   The nursing notes within the ED encounter and vital signs as below have been reviewed by myself. /79   Pulse 65   Temp 97.5 °F (36.4 °C)   Resp 16   Ht 5' 3\" (1.6 m)   Wt 112 lb (50.8 kg)   LMP  (LMP Unknown)   SpO2 99%   BMI 19.84 kg/m²   Oxygen Saturation Interpretation: Normal    The patients available past medical records and past encounters were reviewed.         ------------------------------ ED COURSE/MEDICAL DECISION MAKING----------------------  Medications   0.9 % sodium chloride infusion ( IntraVENous New Bag 9/22/21 0216)             Medical Decision Making:   Patient presenting here because of syncopal episode. Patient was walking to bathroom. Patient was nauseated. Patient had loose stools. Patient reporting no active abdominal pain. Patient does have history of COPD history of acid reflux. Patient reporting no chest pain or difficulty breathing. Patient is awake alert oriented. Patient does complain of headache. Patient did strike her head. Patient lungs are clear abdomen is soft and nontender. Labs noted reviewed. Orthostatics obtained. Patient given IV hydration. Plan will be to admit to monitored bed. Re-Evaluations:             Re-evaluation. Patients symptoms show no change    Patient IV fluids orthostatics ordered and noted. Patient still felt dizzy and lightheaded. Patient abdomen is soft and nontender. Patient made aware of findings and plan. Consultations:            Internal medicine    Critical Care: This patient's ED course included: a personal history and physicial eaxmination    This patient has been closely monitored during their ED course. Counseling: The emergency provider has spoken with the patient and discussed todays results, in addition to providing specific details for the plan of care and counseling regarding the diagnosis and prognosis. Questions are answered at this time and they are agreeable with the plan.       --------------------------------- IMPRESSION AND DISPOSITION ---------------------------------    IMPRESSION  1. Syncope and collapse        DISPOSITION  Disposition: Admit to telemetry  Patient condition is stable        NOTE: This report was transcribed using voice recognition software.  Every effort was made to ensure accuracy; however, inadvertent computerized transcription errors may be present          Briscoe Olszewski, MD  09/22/21 [id number] Joe Irvin MD  09/22/21 5637

## 2021-09-23 LAB
ALBUMIN SERPL-MCNC: 3.6 G/DL (ref 3.5–5.2)
ALP BLD-CCNC: 95 U/L (ref 35–104)
ALT SERPL-CCNC: 9 U/L (ref 0–32)
ANION GAP SERPL CALCULATED.3IONS-SCNC: 5 MMOL/L (ref 7–16)
AST SERPL-CCNC: 17 U/L (ref 0–31)
BILIRUB SERPL-MCNC: 0.3 MG/DL (ref 0–1.2)
BUN BLDV-MCNC: 29 MG/DL (ref 6–23)
CALCIUM SERPL-MCNC: 9.7 MG/DL (ref 8.6–10.2)
CHLORIDE BLD-SCNC: 105 MMOL/L (ref 98–107)
CO2: 28 MMOL/L (ref 22–29)
CREAT SERPL-MCNC: 0.8 MG/DL (ref 0.5–1)
GFR AFRICAN AMERICAN: >60
GFR NON-AFRICAN AMERICAN: >60 ML/MIN/1.73
GLUCOSE BLD-MCNC: 91 MG/DL (ref 74–99)
HCT VFR BLD CALC: 43.1 % (ref 34–48)
HEMOGLOBIN: 13.8 G/DL (ref 11.5–15.5)
MCH RBC QN AUTO: 28.9 PG (ref 26–35)
MCHC RBC AUTO-ENTMCNC: 32 % (ref 32–34.5)
MCV RBC AUTO: 90.2 FL (ref 80–99.9)
PDW BLD-RTO: 15.3 FL (ref 11.5–15)
PLATELET # BLD: 204 E9/L (ref 130–450)
PMV BLD AUTO: 11.1 FL (ref 7–12)
POTASSIUM REFLEX MAGNESIUM: 5 MMOL/L (ref 3.5–5)
RBC # BLD: 4.78 E12/L (ref 3.5–5.5)
SODIUM BLD-SCNC: 138 MMOL/L (ref 132–146)
TOTAL PROTEIN: 5.8 G/DL (ref 6.4–8.3)
WBC # BLD: 6.1 E9/L (ref 4.5–11.5)

## 2021-09-23 PROCEDURE — 36415 COLL VENOUS BLD VENIPUNCTURE: CPT

## 2021-09-23 PROCEDURE — 80053 COMPREHEN METABOLIC PANEL: CPT

## 2021-09-23 PROCEDURE — G0378 HOSPITAL OBSERVATION PER HR: HCPCS

## 2021-09-23 PROCEDURE — 96372 THER/PROPH/DIAG INJ SC/IM: CPT

## 2021-09-23 PROCEDURE — 97161 PT EVAL LOW COMPLEX 20 MIN: CPT

## 2021-09-23 PROCEDURE — 6360000002 HC RX W HCPCS: Performed by: FAMILY MEDICINE

## 2021-09-23 PROCEDURE — 2060000000 HC ICU INTERMEDIATE R&B

## 2021-09-23 PROCEDURE — 2580000003 HC RX 258: Performed by: FAMILY MEDICINE

## 2021-09-23 PROCEDURE — 6370000000 HC RX 637 (ALT 250 FOR IP): Performed by: FAMILY MEDICINE

## 2021-09-23 PROCEDURE — 97530 THERAPEUTIC ACTIVITIES: CPT

## 2021-09-23 PROCEDURE — 85027 COMPLETE CBC AUTOMATED: CPT

## 2021-09-23 RX ADMIN — Medication 10 ML: at 21:00

## 2021-09-23 RX ADMIN — PANTOPRAZOLE SODIUM 40 MG: 40 TABLET, DELAYED RELEASE ORAL at 05:56

## 2021-09-23 RX ADMIN — PANTOPRAZOLE SODIUM 40 MG: 40 TABLET, DELAYED RELEASE ORAL at 16:08

## 2021-09-23 RX ADMIN — ENOXAPARIN SODIUM 40 MG: 40 INJECTION SUBCUTANEOUS at 09:14

## 2021-09-23 RX ADMIN — LISINOPRIL 10 MG: 10 TABLET ORAL at 09:14

## 2021-09-23 RX ADMIN — AMLODIPINE BESYLATE 5 MG: 5 TABLET ORAL at 09:14

## 2021-09-23 RX ADMIN — SODIUM CHLORIDE: 9 INJECTION, SOLUTION INTRAVENOUS at 12:59

## 2021-09-23 ASSESSMENT — PAIN SCALES - GENERAL
PAINLEVEL_OUTOF10: 0

## 2021-09-23 NOTE — PROGRESS NOTES
Hospitalist Progress Note      PCP: Gaston Yeung DO    Date of Admission: 9/22/2021    Chief Complaint: [facility] Course: Patient was placed on telemetry, troponins trended and negative. Not show any acute findings requiring intervention. Echo ordered    Subjective: Patient was seen at bedside today and appeared to be doing well. Patient did not complain of any dizziness on ambulation. But patient states that she has been having some nausea and  dizziness on seeing the patient again around noon when discussed about doing the echo outpatient. Patient was explained that we will continue to monitor and get echo done before discharge as patient continues to have symptoms. Patient does endorse that she has an outpatient cardiologist who can review the results if she feels better to go home once the test is done. Medications:  Reviewed    Infusion Medications    sodium chloride      sodium chloride 75 mL/hr at 09/22/21 2638     Scheduled Medications    amLODIPine  5 mg Oral Daily    lisinopril  10 mg Oral Daily    sodium chloride flush  10 mL IntraVENous 2 times per day    enoxaparin  40 mg SubCUTAneous Daily    pantoprazole  40 mg Oral BID AC     PRN Meds: sodium chloride flush, sodium chloride, promethazine **OR** ondansetron, polyethylene glycol, acetaminophen **OR** acetaminophen, perflutren lipid microspheres      Intake/Output Summary (Last 24 hours) at 9/23/2021 1238  Last data filed at 9/23/2021 0640  Gross per 24 hour   Intake 777 ml   Output 0 ml   Net 777 ml       Exam:    BP (!) 149/81   Pulse 72   Temp 97 °F (36.1 °C) (Temporal)   Resp 16   Ht 5' 3\" (1.6 m)   Wt 112 lb (50.8 kg)   LMP  (LMP Unknown)   SpO2 96%   BMI 19.84 kg/m²     General appearance: No apparent distress, appears stated age and cooperative. HEENT: Pupils equal, round, and reactive to light. Conjunctivae/corneas clear. Neck: Supple, with full range of motion.  No jugular venous distention. Trachea midline. Respiratory:  Normal respiratory effort. Clear to auscultation, bilaterally without Rales/Wheezes/Rhonchi. Cardiovascular: Regular rate and rhythm with normal S1/S2 without murmurs, rubs or gallops. Abdomen: Soft, non-tender, non-distended with normal bowel sounds. Musculoskeletal: No clubbing, cyanosis or edema bilaterally. Full range of motion without deformity. Skin: Skin color, texture, turgor normal.  No rashes or lesions. Neurologic:  Neurovascularly intact without any focal sensory/motor deficits. Cranial nerves: II-XII intact, grossly non-focal.  Psychiatric: Alert and oriented, thought content appropriate, normal insight    Labs:   Recent Labs     09/22/21  0146 09/23/21  0534   WBC 15.2* 6.1   HGB 14.2 13.8   HCT 44.4 43.1    204     Recent Labs     09/22/21 0146 09/23/21  0534    138   K 4.6 5.0    105   CO2 28 28   BUN 33* 29*   CREATININE 1.1* 0.8   CALCIUM 10.1 9.7     Recent Labs     09/22/21  0146 09/23/21  0534   AST 18 17   ALT 9 9   BILITOT 0.3 0.3   ALKPHOS 127* 95     No results for input(s): INR in the last 72 hours. No results for input(s): Wilmer Clap in the last 72 hours. Assessment/Plan:    Active Hospital Problems    Diagnosis Date Noted    Syncope and collapse [R55] 09/22/2021       -No acute chest pains currently  -Troponins trended and negative  -CT of the spine did not show any acute findings  -Thyroid nodule-incidental-no further imaging recommended. TSH ordered and within normal limits  -Echo ordered-pending  -Orthostatic vitals Pending  -5 mm right apical pleural parenchymal nodule-asymptomatic-follow-up imaging in 12 months  -On telemetry we will continue  -We will consider cardiology consult if patient continues to have dizziness    DVT Prophylaxis: Lovenox  Diet: ADULT DIET; Regular  Code Status: Full Code    PT/OT Eval Status: reviewed and stable for home @d/c    Dispo - awaiting ECHO.     Warner Reyez MD

## 2021-09-23 NOTE — PROGRESS NOTES
Physical Therapy    Physical Therapy Initial Assessment     Name: Gavin Sanz  : 1940  MRN: [id number]      Date of Service: 2021    Evaluating PT: Coral Bell PT, DPT [id number]      Room #:  6654/4279-S  Diagnosis:  Syncope and collapse [R55]  PMHx/PSHx:  PE, HLD, HTN, CA, hiatal hernia, OA, hx of blood clots, COPD  Precautions:  Fall risk    SUBJECTIVE:    Pt lives with sister in a single story house with 3 stair(s) and 2 rail(s) to enter. Bed is on the first floor and bath is on the first floor. Pt ambulated without AD prior to admission. OBJECTIVE:   Initial Evaluation  Date: 21 Treatment Date: Short Term/ Long Term   Goals   AM-PAC 6 Clicks 80/76     Was pt agreeable to Eval/treatment? Yes     Does pt have pain? 2/10 R HA pain     Bed Mobility  Rolling: NT  Supine to sit: Supervision  Sit to supine: NT  Scooting: Supervision  Rolling: Independent   Supine to sit: Independent   Sit to supine: Independent   Scooting: Independent    Transfers Sit to stand: Supervision  Stand to sit: Supervision  Stand pivot: Supervision without AD  Sit to stand: Independent   Stand to sit: Independent   Stand pivot: Independent    Ambulation   50 feet x2 without AD with Supervision  400 feet Independent    Stair negotiation: ascended and descended NT  10 step(s) with 1 rail(s) Mod Independent    ROM BUE: Refer to OT note  BLE: WFL     Strength BUE: Refer to OT note  BLE: WFL     Balance Sitting EOB: Independent   Dynamic Standing: Supervision without AD  Dynamic Standing: Independent      Pt is A & O x: 4 to person, place, month/year, and situation. Sensation: Pt denies numbness and tingling of extremities. Edema: Unremarkable. Therapeutic Exercises:  5x STS from EOB    Patient education  Pt educated on PT role in acute care setting.     Patient response to education:   Pt verbalized understanding Pt demonstrated skill Pt requires further education in this area   Yes NA No ASSESSMENT:    Conditions Requiring Skilled Therapeutic Intervention:    [x]Decreased strength     []Decreased ROM  [x]Decreased functional mobility  [x]Decreased balance   []Decreased endurance   []Decreased posture  []Decreased sensation  []Decreased coordination   [x]Decreased vision  []Decreased safety awareness   []Increased pain       Comments:    Pt was in bed upon room entry, agreeable to PT evaluation. Pt has fair steadiness when ambulating around room. No LOB noted but pt reached for IV pole at times. Pt completed 5x STS exercise as noted above. Pt was educated on taking rest breaks following transfers to reduce risk for falls. Pt was left seated in chair with all needs met at conclusion of session. Treatment:  Patient practiced and was instructed in the following treatment:     Therapeutic activities:  o Transfers: Pt was cued for hand placement during sit <> stand transfers. Pt completed multiple transfers from surfaces of varying heights (EOB x2, chair x5). o Ambulation: Pt ambulated x2 reps around room without AD as standing balance was challenged. o Education: Pt was educated on taking rest breaks following transfers to reduce risk for falls.  Therapeutic exercise: Pt completed 5x STS exercise as noted above. Pt's/family goals:  1. To return home. Prognosis is Good for reaching above PT goals. Patient and or family understand(s) diagnosis, prognosis, and plan of care. Yes. PHYSICAL THERAPY PLAN OF CARE:    PT POC is established based on physician order and patient diagnosis     Referring provider/PT Order:    Start   Ordering Provider    09/22/21 0800  PT eval and treat Start: 09/22/21 0800, End: 09/22/21 0800, ONE TIME, Standing Count: 1 Occurrences, R      Javier Jeffers DO        Diagnosis:  Syncope and collapse [R55]  Specific instructions for next treatment:  Increase ambulation distance.     Current Treatment Recommendations:     [x] Strengthening to improve independence

## 2021-09-23 NOTE — CARE COORDINATION
9/23/2021 - Care Coordination - admitted for syncope. IV NS @ 75 cc/hr. Echo ordered. Spoke with pt and her sister in room to discuss discharge planning. PCP is Dr Ira Arellano. [facility] is [facility] [place]. Recently sold her home and moved in with her sister in a ranch style home with 2 steps to enter. Denies hx HHC, ANNEMARIE/ARU. Has DME  - sc, fww, cane etc (from her  when he was ill) but she does not use. Independent in ADLs. She plans on discharging to her sister's home when medically stable. Her sister will provide transportation home. SW/CM will follow.

## 2021-09-23 NOTE — PROGRESS NOTES
BP Lying 117/63  HR Lying 68    BP Sitting 114/64  HR Sitting 71    BP Standing 123/71  HR Standing 73

## 2021-09-24 VITALS
OXYGEN SATURATION: 95 % | BODY MASS INDEX: 19.84 KG/M2 | HEIGHT: 63 IN | SYSTOLIC BLOOD PRESSURE: 179 MMHG | DIASTOLIC BLOOD PRESSURE: 78 MMHG | HEART RATE: 60 BPM | RESPIRATION RATE: 18 BRPM | TEMPERATURE: 98.7 F | WEIGHT: 112 LBS

## 2021-09-24 LAB
LV EF: 63 %
LVEF MODALITY: NORMAL

## 2021-09-24 PROCEDURE — 96372 THER/PROPH/DIAG INJ SC/IM: CPT

## 2021-09-24 PROCEDURE — 6360000002 HC RX W HCPCS: Performed by: FAMILY MEDICINE

## 2021-09-24 PROCEDURE — G0378 HOSPITAL OBSERVATION PER HR: HCPCS

## 2021-09-24 PROCEDURE — 2580000003 HC RX 258: Performed by: FAMILY MEDICINE

## 2021-09-24 PROCEDURE — 6370000000 HC RX 637 (ALT 250 FOR IP): Performed by: FAMILY MEDICINE

## 2021-09-24 PROCEDURE — 93306 TTE W/DOPPLER COMPLETE: CPT

## 2021-09-24 RX ORDER — AMLODIPINE BESYLATE 5 MG/1
5 TABLET ORAL DAILY
Qty: 30 TABLET | Refills: 3 | Status: SHIPPED | OUTPATIENT
Start: 2021-09-25

## 2021-09-24 RX ADMIN — ENOXAPARIN SODIUM 40 MG: 40 INJECTION SUBCUTANEOUS at 08:58

## 2021-09-24 RX ADMIN — Medication 10 ML: at 08:53

## 2021-09-24 RX ADMIN — AMLODIPINE BESYLATE 5 MG: 5 TABLET ORAL at 08:53

## 2021-09-24 RX ADMIN — LISINOPRIL 10 MG: 10 TABLET ORAL at 08:54

## 2021-09-24 RX ADMIN — PANTOPRAZOLE SODIUM 40 MG: 40 TABLET, DELAYED RELEASE ORAL at 05:20

## 2021-09-24 ASSESSMENT — PAIN SCALES - GENERAL
PAINLEVEL_OUTOF10: 0

## 2021-09-24 NOTE — DISCHARGE SUMMARY
2 times daily            Activity: activity as tolerated  Diet: regular diet    Follow-up with 1 week PCP    Note that over 30 minutes was spent in preparing discharge papers, discussing discharge with patient, staff, consultants, medication review, arranging follow up, etc.    Signed:  Cassandra Allen MD  9/24/2021  2:33 PM

## 2021-09-24 NOTE — PROGRESS NOTES
[facility]   Department of Pharmacy   Pharmacist Transition of Care Services         Patient Demographics  Name:  Akira Griffiths   Medical Record Number:  [id number]  Gender:  female   Age:  78 y.o. YOB: 1940    Readmission Risk: 12%       Pharmacist Review and Summary of Medications     Date of last reviewed/update: 9/24/21     Category Comments   New Medication Started        Change in Outpatient Medication 1. Amlodipine 10 mg PO daily changed to 5 mg PO daily      Discontinued/On hold Outpatient Medication       Other          Pharmacist Patient Education:    Date  Person Educated Content of Education             Documentation of Pharmacist Interventions and Follow-up Plan:     The following Pharmacist Transition of Care Services were completed:   [x]  Reviewed and summarized medication changes  []  Entire home medication list was reviewed for accuracy  []  Home medication list was updated or corrected    [x]  Reviewed discharge medication reconciliation  []  Discharge medication list was updated or corrected    []  Added information to AVS   []  Patient education was provided on new medications  []  Patient education was provided on medication changes  []  Reviewed the AVS with patient    Additional Interventions:  []  Inpatient prescriber was contacted and the following pharmacy recommendations        were accepted: **     [] Other interventions: **        Pharmacist: Pascale Kevin [id number] [facility] PharmD, [facility]  Date:  9/24/2021 3:21 PM

## 2023-03-08 ENCOUNTER — APPOINTMENT (OUTPATIENT)
Dept: CT IMAGING | Age: 83
End: 2023-03-08
Payer: MEDICARE

## 2023-03-08 ENCOUNTER — APPOINTMENT (OUTPATIENT)
Dept: GENERAL RADIOLOGY | Age: 83
End: 2023-03-08
Payer: MEDICARE

## 2023-03-08 ENCOUNTER — HOSPITAL ENCOUNTER (EMERGENCY)
Age: 83
Discharge: HOME | End: 2023-03-08
Attending: EMERGENCY MEDICINE
Payer: MEDICARE

## 2023-03-08 VITALS
BODY MASS INDEX: 19.49 KG/M2 | SYSTOLIC BLOOD PRESSURE: 111 MMHG | TEMPERATURE: 98.6 F | RESPIRATION RATE: 16 BRPM | DIASTOLIC BLOOD PRESSURE: 70 MMHG | WEIGHT: 110 LBS | HEART RATE: 87 BPM | OXYGEN SATURATION: 96 % | HEIGHT: 63 IN

## 2023-03-08 DIAGNOSIS — R11.0 NAUSEA: Primary | ICD-10-CM

## 2023-03-08 DIAGNOSIS — K59.00 CONSTIPATION, UNSPECIFIED CONSTIPATION TYPE: ICD-10-CM

## 2023-03-08 DIAGNOSIS — J18.9 PNEUMONIA DUE TO INFECTIOUS ORGANISM, UNSPECIFIED LATERALITY, UNSPECIFIED PART OF LUNG: ICD-10-CM

## 2023-03-08 LAB
ALBUMIN SERPL-MCNC: 3.8 G/DL (ref 3.5–5.2)
ALP BLD-CCNC: 90 U/L (ref 35–104)
ALT SERPL-CCNC: 16 U/L (ref 0–32)
ANION GAP SERPL CALCULATED.3IONS-SCNC: 8 MMOL/L (ref 7–16)
AST SERPL-CCNC: 22 U/L (ref 0–31)
BASOPHILS ABSOLUTE: 0.03 E9/L (ref 0–0.2)
BASOPHILS RELATIVE PERCENT: 0.2 % (ref 0–2)
BILIRUB SERPL-MCNC: 0.4 MG/DL (ref 0–1.2)
BUN BLDV-MCNC: 24 MG/DL (ref 6–23)
BURR CELLS: ABNORMAL
CALCIUM SERPL-MCNC: 9.9 MG/DL (ref 8.6–10.2)
CHLORIDE BLD-SCNC: 104 MMOL/L (ref 98–107)
CO2: 28 MMOL/L (ref 22–29)
CREAT SERPL-MCNC: 0.8 MG/DL (ref 0.5–1)
EKG ATRIAL RATE: 95 BPM
EKG P AXIS: 57 DEGREES
EKG P-R INTERVAL: 170 MS
EKG Q-T INTERVAL: 370 MS
EKG QRS DURATION: 88 MS
EKG QTC CALCULATION (BAZETT): 464 MS
EKG R AXIS: -49 DEGREES
EKG T AXIS: 76 DEGREES
EKG VENTRICULAR RATE: 95 BPM
EOSINOPHILS ABSOLUTE: 0.01 E9/L (ref 0.05–0.5)
EOSINOPHILS RELATIVE PERCENT: 0.1 % (ref 0–6)
GFR SERPL CREATININE-BSD FRML MDRD: >60 ML/MIN/1.73
GLUCOSE BLD-MCNC: 129 MG/DL (ref 74–99)
HCT VFR BLD CALC: 43.5 % (ref 34–48)
HEMOGLOBIN: 14.2 G/DL (ref 11.5–15.5)
IMMATURE GRANULOCYTES #: 0.07 E9/L
IMMATURE GRANULOCYTES %: 0.5 % (ref 0–5)
LACTIC ACID: 1.5 MMOL/L (ref 0.5–2.2)
LIPASE: 34 U/L (ref 13–60)
LYMPHOCYTES ABSOLUTE: 0.29 E9/L (ref 1.5–4)
LYMPHOCYTES RELATIVE PERCENT: 2 % (ref 20–42)
MCH RBC QN AUTO: 29.6 PG (ref 26–35)
MCHC RBC AUTO-ENTMCNC: 32.6 % (ref 32–34.5)
MCV RBC AUTO: 90.8 FL (ref 80–99.9)
MONOCYTES ABSOLUTE: 0.66 E9/L (ref 0.1–0.95)
MONOCYTES RELATIVE PERCENT: 4.5 % (ref 2–12)
NEUTROPHILS ABSOLUTE: 13.51 E9/L (ref 1.8–7.3)
NEUTROPHILS RELATIVE PERCENT: 92.7 % (ref 43–80)
PDW BLD-RTO: 14.1 FL (ref 11.5–15)
PLATELET # BLD: 173 E9/L (ref 130–450)
PMV BLD AUTO: 11 FL (ref 7–12)
POIKILOCYTES: ABNORMAL
POTASSIUM SERPL-SCNC: 4.3 MMOL/L (ref 3.5–5)
RBC # BLD: 4.79 E12/L (ref 3.5–5.5)
SODIUM BLD-SCNC: 140 MMOL/L (ref 132–146)
TOTAL PROTEIN: 6 G/DL (ref 6.4–8.3)
TROPONIN, HIGH SENSITIVITY: 11 NG/L (ref 0–9)
TROPONIN, HIGH SENSITIVITY: 11 NG/L (ref 0–9)
WBC # BLD: 14.6 E9/L (ref 4.5–11.5)

## 2023-03-08 PROCEDURE — 74177 CT ABD & PELVIS W/CONTRAST: CPT

## 2023-03-08 PROCEDURE — 85025 COMPLETE CBC W/AUTO DIFF WBC: CPT

## 2023-03-08 PROCEDURE — 2500000003 HC RX 250 WO HCPCS: Performed by: STUDENT IN AN ORGANIZED HEALTH CARE EDUCATION/TRAINING PROGRAM

## 2023-03-08 PROCEDURE — 71045 X-RAY EXAM CHEST 1 VIEW: CPT

## 2023-03-08 PROCEDURE — 83690 ASSAY OF LIPASE: CPT

## 2023-03-08 PROCEDURE — 6360000002 HC RX W HCPCS: Performed by: STUDENT IN AN ORGANIZED HEALTH CARE EDUCATION/TRAINING PROGRAM

## 2023-03-08 PROCEDURE — 6370000000 HC RX 637 (ALT 250 FOR IP): Performed by: STUDENT IN AN ORGANIZED HEALTH CARE EDUCATION/TRAINING PROGRAM

## 2023-03-08 PROCEDURE — 36415 COLL VENOUS BLD VENIPUNCTURE: CPT

## 2023-03-08 PROCEDURE — 2580000003 HC RX 258: Performed by: STUDENT IN AN ORGANIZED HEALTH CARE EDUCATION/TRAINING PROGRAM

## 2023-03-08 PROCEDURE — 93010 ELECTROCARDIOGRAM REPORT: CPT | Performed by: INTERNAL MEDICINE

## 2023-03-08 PROCEDURE — 96374 THER/PROPH/DIAG INJ IV PUSH: CPT

## 2023-03-08 PROCEDURE — 80053 COMPREHEN METABOLIC PANEL: CPT

## 2023-03-08 PROCEDURE — 99285 EMERGENCY DEPT VISIT HI MDM: CPT

## 2023-03-08 PROCEDURE — 83605 ASSAY OF LACTIC ACID: CPT

## 2023-03-08 PROCEDURE — 6360000004 HC RX CONTRAST MEDICATION: Performed by: RADIOLOGY

## 2023-03-08 PROCEDURE — 84484 ASSAY OF TROPONIN QUANT: CPT

## 2023-03-08 PROCEDURE — A4216 STERILE WATER/SALINE, 10 ML: HCPCS | Performed by: STUDENT IN AN ORGANIZED HEALTH CARE EDUCATION/TRAINING PROGRAM

## 2023-03-08 PROCEDURE — 93005 ELECTROCARDIOGRAM TRACING: CPT | Performed by: STUDENT IN AN ORGANIZED HEALTH CARE EDUCATION/TRAINING PROGRAM

## 2023-03-08 PROCEDURE — 96375 TX/PRO/DX INJ NEW DRUG ADDON: CPT

## 2023-03-08 RX ORDER — 0.9 % SODIUM CHLORIDE 0.9 %
1000 INTRAVENOUS SOLUTION INTRAVENOUS ONCE
Status: COMPLETED | OUTPATIENT
Start: 2023-03-08 | End: 2023-03-08

## 2023-03-08 RX ORDER — POLYETHYLENE GLYCOL 3350 17 G/17G
17 POWDER, FOR SOLUTION ORAL DAILY PRN
Qty: 578 G | Refills: 1 | Status: SHIPPED | OUTPATIENT
Start: 2023-03-08 | End: 2023-04-07

## 2023-03-08 RX ORDER — FENTANYL CITRATE 0.05 MG/ML
25 INJECTION, SOLUTION INTRAMUSCULAR; INTRAVENOUS ONCE
Status: COMPLETED | OUTPATIENT
Start: 2023-03-08 | End: 2023-03-08

## 2023-03-08 RX ORDER — ACETAMINOPHEN 500 MG
1000 TABLET ORAL ONCE
Status: COMPLETED | OUTPATIENT
Start: 2023-03-08 | End: 2023-03-08

## 2023-03-08 RX ORDER — DOCUSATE SODIUM 100 MG/1
100 CAPSULE, LIQUID FILLED ORAL 2 TIMES DAILY
Qty: 60 CAPSULE | Refills: 0 | Status: SHIPPED | OUTPATIENT
Start: 2023-03-08 | End: 2023-04-07

## 2023-03-08 RX ORDER — SODIUM CHLORIDE 0.9 % (FLUSH) 0.9 %
SYRINGE (ML) INJECTION
Status: DISCONTINUED
Start: 2023-03-08 | End: 2023-03-08 | Stop reason: HOSPADM

## 2023-03-08 RX ORDER — ONDANSETRON 2 MG/ML
4 INJECTION INTRAMUSCULAR; INTRAVENOUS ONCE
Status: COMPLETED | OUTPATIENT
Start: 2023-03-08 | End: 2023-03-08

## 2023-03-08 RX ADMIN — FAMOTIDINE 20 MG: 10 INJECTION, SOLUTION INTRAVENOUS at 04:39

## 2023-03-08 RX ADMIN — IOPAMIDOL 80 ML: 755 INJECTION, SOLUTION INTRAVENOUS at 06:37

## 2023-03-08 RX ADMIN — ACETAMINOPHEN 1000 MG: 500 TABLET ORAL at 07:32

## 2023-03-08 RX ADMIN — FENTANYL CITRATE 25 MCG: 0.05 INJECTION, SOLUTION INTRAMUSCULAR; INTRAVENOUS at 04:40

## 2023-03-08 RX ADMIN — SODIUM CHLORIDE 1000 ML: 9 INJECTION, SOLUTION INTRAVENOUS at 04:40

## 2023-03-08 RX ADMIN — ONDANSETRON 4 MG: 2 INJECTION INTRAMUSCULAR; INTRAVENOUS at 04:40

## 2023-03-08 ASSESSMENT — PAIN SCALES - GENERAL
PAINLEVEL_OUTOF10: 10
PAINLEVEL_OUTOF10: 4

## 2023-03-08 NOTE — ED PROVIDER NOTES
[address]      Pt Name: Brisa Aviles  MRN: [id number]  [place] 1940  Date of evaluation: 3/8/2023  Provider: Maria Teresa Andujar DO  PCP: Deirdre Gamble DO  Note Started: 4:02 AM EST 3/8/23    CHIEF COMPLAINT       Chief Complaint   Patient presents with    Nausea     Pt nausea and dry heaves for 2 hrs       HISTORY OF PRESENT ILLNESS: 1 or more Elements   History From: Patient  Limitations to history : None    Brisa Aviles is a 78 y.o. female who presents to the ED due to nausea and abdominal pain. Patient states that she has significant abdominal surgical history with repair of a hiatal hernia several years ago. States that she is unable to vomit since the surgery and has been on omeprazole twice daily ever since. She notes that she forgot to take her nightly dose of omeprazole last night. She woke up around 1 AM with epigastric abdominal pain and nausea. She has not been having dry heaves without any episodes of vomiting. She also endorses bilateral shoulder pain since waking up. Denies any recent fevers or chills. Patient has no chest pain or shortness of breath. Nursing Notes were all reviewed and agreed with or any disagreements were addressed in the HPI. REVIEW OF SYSTEMS :    Positives and Pertinent negatives as per HPI.      SURGICAL HISTORY     Past Surgical History:   Procedure Laterality Date    ABDOMEN SURGERY      colon resection    APPENDECTOMY      BREAST SURGERY      biopsies on both breasts     COLONOSCOPY  2012    Dr Suzy Krabbe    COLONOSCOPY  10/9/13    COLONOSCOPY  2/19/15    2 polyps ascending colon    COLONOSCOPY N/A 12/23/2020    COLONOSCOPY POLYPECTOMY HOT BIOPSY performed by Laurence Frye MD at [facility], DIAGNOSTIC      EYE SURGERY      cataracts both eyes    [address] (CERVIX STATUS UNKNOWN)      OTHER SURGICAL HISTORY  10/14/2016    laparoscopic paraesophageal hernia repair with fundiplication    [place] COLONOSCOPY FLX DX W/COLLJ SPEC WHEN PFRMD N/A 4/18/2018    COLONOSCOPY performed by Josefa Benito MD at [place] [address].      right shoulder    Aicha David GASTROINTESTINAL ENDOSCOPY  02/01/2017    Dr Anna Liu ENDOSCOPY N/A 4/18/2018    EGD BIOPSY performed by Josefa Benito MD at [address] [place] [address] 6/13/2019    EGD BIOPSY performed by Josefa Benito MD at [address] [place] [address] 12/23/2020    EGD BIOPSY performed by Josefa Benito MD at [address] [place] [address]       Discharge Medication List as of 3/8/2023  7:37 AM        CONTINUE these medications which have NOT CHANGED    Details   amLODIPine (NORVASC) 5 MG tablet Take 1 tablet by mouth daily, Disp-30 tablet, R-3Normal      lisinopril (PRINIVIL;ZESTRIL) 10 MG tablet Take 10 mg by mouth dailyHistorical Med      Magnesium Hydroxide (MAGNESIA PO) Take 1 tablet by mouth dailyHistorical Med      Esomeprazole Magnesium 20 MG TBEC Take 20 mg by mouth 2 times daily Historical Med      VITAMIN A Take 1 tablet by mouth 2 times daily Historical Med             ALLERGIES     Sulfa antibiotics, Acetaminophen-codeine, and Hydrocodone    FAMILYHISTORY       Family History   Problem Relation Age of Onset    Heart Disease Mother     Cancer Father         prostate    Other Brother         macular degeneration        SOCIAL HISTORY       Social History     Tobacco Use    Smoking status: Every Day     Packs/day: 0.50     Years: 50.00     Pack years: 25.00     Types: Cigarettes    Smokeless tobacco: Never    Tobacco comments:     1 pack per week   Vaping Use    Vaping Use: Never used   Substance Use Topics    Alcohol use: No    Drug use:  No SCREENINGS        Vadim Coma Scale  Eye Opening: Spontaneous  Best Verbal Response: Oriented  Best Motor Response: Obeys commands  Vadim Coma Scale Score: 15                CIWA Assessment  BP: 111/70  Heart Rate: 87           PHYSICAL EXAM  1 or more Elements     ED Triage Vitals [03/08/23 0347]   BP Temp Temp src Heart Rate Resp SpO2 Height Weight   -- 98.6 °F (37 °C) -- 100 16 98 % -- --     Constitutional/General: Alert and oriented x3  Head: Normocephalic and atraumatic  Respiratory: Lungs clear to auscultation bilaterally, no wheezes, rales, or rhonchi. Not in respiratory distress  Cardiovascular:  Regular rate. Regular rhythm. No murmurs, no gallops, no rubs. 2+ distal pulses. Equal extremity pulses. Chest: No chest wall tenderness  GI: Epigastric abdominal pain, abdomen is soft and nondistended. .  +BS. No rebound, guarding, or rigidity. No pulsatile masses. Musculoskeletal: Moves all extremities x 4. Warm and well perfused, no clubbing, no cyanosis, no edema. Capillary refill <3 seconds  Integument: skin warm and dry. No rashes.    Neurologic: GCS 15, no focal deficits    DIAGNOSTIC RESULTS   LABS:    Labs Reviewed   CBC WITH AUTO DIFFERENTIAL - Abnormal; Notable for the following components:       Result Value    WBC 14.6 (*)     Neutrophils % 92.7 (*)     Lymphocytes % 2.0 (*)     Neutrophils Absolute 13.51 (*)     Lymphocytes Absolute 0.29 (*)     Eosinophils Absolute 0.01 (*)     All other components within normal limits   COMPREHENSIVE METABOLIC PANEL - Abnormal; Notable for the following components:    Glucose 129 (*)     BUN 24 (*)     Total Protein 6.0 (*)     All other components within normal limits   TROPONIN - Abnormal; Notable for the following components:    Troponin, High Sensitivity 11 (*)     All other components within normal limits   TROPONIN - Abnormal; Notable for the following components:    Troponin, High Sensitivity 11 (*)     All other components within normal limits LACTIC ACID   LIPASE       As interpreted by me, the above displayed labs are abnormal. All other labs obtained during this visit were within normal range or not returned as of this dictation. EKG Interpretation:  Interpreted by emergency department physician, Omid Casiano DO  Rate: 95  Rhythm: Normal sinus  Interpretation: Sinus arrhythmia left anterior fascicular block  Comparison: stable as compared to patient's most recent EKG    RADIOLOGY:   Non-plain film images such as CT, Ultrasound and MRI are read by the radiologist. Plain radiographic images are visualized and preliminarily interpreted by the ED Provider with the below findings:    Interpretation per the Radiologist below, if available at the time of this note:    CT ABDOMEN PELVIS W IV CONTRAST Additional Contrast? None   Final Result   Small to moderate hiatal hernia      Distended small bowel segments with fecalization throughout creating   prominence along with large colonic stool burden extending to the proximal   colonic segments. No distinct transition point with questionable   inflammatory segment in the lower mid abdomen could represent components of   enteritis with surrounding stasis of ileus versus partial or intermittent   obstructive process however fecalization suggest a more longstanding   appearance of stasis without significant air-fluid levels. No evidence for   perforation or abscess. Patent colorectal anastomosis. XR CHEST PORTABLE   Final Result   New right-sided pneumonia. No results found. No results found.     PROCEDURES   Unless otherwise noted below, none     CRITICAL CARE TIME (.cct)   None    PAST MEDICAL HISTORY/Chronic Conditions Affecting Care    has a past medical history of Anesthesia, Anesthesia complication, Arthritis, Cancer ([address] (1994), COPD (chronic obstructive pulmonary disease) ([address] GERD (gastroesophageal reflux disease), H/O factor V Leiden mutation, Hiatal hernia, blood clots, mammogram, Hyperlipidemia, Hypertension, Phlebitis, and Pulmonary embolism ([facility] [address] EMERGENCY DEPARTMENT COURSE    Vitals:    Vitals:    03/08/23 0347 03/08/23 0412 03/08/23 0441 03/08/23 0733   BP:   128/65 111/70   Pulse: 100  90 87   Resp: 16   16   Temp: 98.6 °F (37 °C)   98.6 °F (37 °C)   TempSrc:    Oral   SpO2: 98%   96%   Weight:  110 lb (49.9 kg)     Height:  5' 3\" (1.6 m)         Patient was given the following medications:  Medications   0.9 % sodium chloride bolus (0 mLs IntraVENous Stopped 3/8/23 0700)   ondansetron (ZOFRAN) injection 4 mg (4 mg IntraVENous Given 3/8/23 0440)   famotidine (PEPCID) 20 mg in sodium chloride (PF) 0.9 % 10 mL injection (20 mg IntraVENous Given 3/8/23 0439)   fentaNYL (SUBLIMAZE) injection 25 mcg (25 mcg IntraVENous Given 3/8/23 0440)   iopamidol (ISOVUE-370) 76 % injection 80 mL (80 mLs IntraVENous Given 3/8/23 0637)   acetaminophen (TYLENOL) tablet 1,000 mg (1,000 mg Oral Given 3/8/23 0732)         Is this patient to be included in the SEP-1 Core Measure due to severe sepsis or septic shock? No Exclusion criteria - the patient is NOT to be included for SEP-1 Core Measure due to: Infection is not suspected      Medical Decision Making/Differential Diagnosis:    CC/HPI Summary, Social Determinants of health, Records Reviewed, DDx, testing done/not done, ED Course, Reassessment, disposition considerations/shared decision making with patient, consults, disposition:      ED Course as of 03/09/23 0548   Wed Mar 08, 2023   0729 Patient reevaluated. Noted CT findings of constipation. Patient states that she does have issues with using the restroom. Remainder of laboratory work is reassuring. Patient does note improvement in nausea. Patient be discharged home with supportive measures.  [BP]      ED Course User Index  [BP] Keenan Michelle DO        Chronic Conditions:   Past Medical History:   Diagnosis Date    Anesthesia     with rectal repair had spinal heart stopped Anesthesia complication     no spinals heart stopped    Arthritis     Cancer ([facility] [address] 1994    colon    COPD (chronic obstructive pulmonary disease) (HCC)     GERD (gastroesophageal reflux disease)     H/O factor V Leiden mutation     on Coumadin    Hiatal hernia     Hx of blood clots     Hx of mammogram     probably last year per patient    Hyperlipidemia     Hypertension     Phlebitis     Pulmonary embolism ([facility] [address]        CONSULTS: (Who and What was discussed)  None    Discussion with Other Profesionals : None    Social Determinants : None    Records Reviewed : None    CC/HPI Summary, DDx, ED Course, and Reassessment: EKG is ordered to have documentation of patient's current rhythm, and to rule out any obvious acute cardiac illnesses such as ACS. Additionally, QT interval may be of use in decision making regarding any medications administered here in the ED. Patient is placed on cardiac monitor and continuous pulse ox for monitoring. CBC is ordered to evaluate for any signs of infection or inflammation by obtaining a WBC count, or any signs of acute anemia by interpreting hemoglobin. CMP was ordered to evaluate for any electrolyte imbalances, kidney function, or any elevations in anion gap. Troponin ordered to evaluate for possible cardiac etiology of symptoms including but not limited to STEMI or NSTEMI. Lipase levels were ordered to evaluate for possible elevations which suggest pancreatic etiology of symptoms. Lactic acid levels were ordered to evaluate for signs of ischemia or decrease perfusion to organ systems. Chest x-ray is ordered to evaluate for any possible signs of pneumonia, pleural effusions, cardiomegaly, pneumothorax, atelectasis, rib or sternal abnormalities including fractures. A CT abdomen with IV contrast was ordered to evaluate for, but without limitation, constipation, small bowel obstruction, bowel ischemia, pneumoperitoneum, diverticulitis, cholecystitis, appendicitis, perforation.   Patient initially given fentanyl, Pepcid, Zofran and 1 L normal saline bolus for her symptoms. CBC revealed mild leukocytosis with a white count of 14.6. CMP revealed markedly benign values. Lactic acid and lipase were both normal.  Initial troponin was 11 with a repeat of also 11. On reevaluation patient reported mild epigastric pain and was given a dose of Tylenol. CT abdomen pelvis revealed small to moderate hiatal hernia with distended small bowel with fecalization throughout with large colonic stool burden without a distinct transition point. Findings likely compatible with enteritis. Revealed new right-sided pneumonia. Patient will be prescribed antibiotic in addition to Colace and MiraLAX. She has been told to follow with her primary care provider regarding her symptoms. She was instructed to return to the ED if her symptoms return, worsen or change in any time. Disposition Considerations (Tests not ordered but considered, Shared Decision Making, Pt Expectation of Test or Tx.): Patient was called in a prescription for doxycycline and Omnicef for her pneumonia upon chart review. FINAL IMPRESSION      1. Nausea    2. Constipation, unspecified constipation type    3.  Pneumonia due to infectious organism, unspecified laterality, unspecified part of lung          DISPOSITION/PLAN     DISPOSITION Decision To Discharge 03/08/2023 07:30:39 AM    PATIENT REFERRED TO:  Reina Herman DO  [address] [facility] [phone]    Schedule an appointment as soon as possible for a visit       [address] [facility] Emergency Department  [address]  Go to   If symptoms worsen    DISCHARGE MEDICATIONS:  Discharge Medication List as of 3/8/2023  7:37 AM        START taking these medications    Details   docusate sodium (COLACE) 100 MG capsule Take 1 capsule by mouth 2 times daily, Disp-60 capsule, R-0Normal      polyethylene glycol (GLYCOLAX) 17 GM/SCOOP powder Take 17 g by mouth daily as needed (constipation), Disp-578 g, R-1Normal             DISCONTINUED MEDICATIONS:  Discharge Medication List as of 3/8/2023  7:37 AM               (Please note that portions of this note were completed with a voice recognition program.  Efforts were made to edit the dictations but occasionally words are mis-transcribed.)    Michelle Carrasco DO (electronically signed)      Michelle Carrasco DO  Resident  03/10/23 1300

## 2023-03-08 NOTE — DISCHARGE INSTRUCTIONS
Increase fluids  Take all medication as prescribed  Follow-up with primary care doctor  If you notice any new worrisome symptoms please return to emergency department for evaluation

## 2023-03-10 RX ORDER — DOXYCYCLINE HYCLATE 100 MG
100 TABLET ORAL 2 TIMES DAILY
Qty: 14 TABLET | Refills: 0 | OUTPATIENT
Start: 2023-03-10 | End: 2023-03-17

## 2023-03-10 RX ORDER — CEFDINIR 300 MG/1
300 CAPSULE ORAL 2 TIMES DAILY
Qty: 14 CAPSULE | Refills: 0 | OUTPATIENT
Start: 2023-03-10 | End: 2023-03-17

## 2023-05-17 ENCOUNTER — OFFICE VISIT (OUTPATIENT)
Dept: NEUROSURGERY | Age: 83
End: 2023-05-17
Payer: MEDICARE

## 2023-05-17 VITALS
HEART RATE: 67 BPM | HEIGHT: 63 IN | BODY MASS INDEX: 19.49 KG/M2 | OXYGEN SATURATION: 95 % | WEIGHT: 110 LBS | DIASTOLIC BLOOD PRESSURE: 86 MMHG | RESPIRATION RATE: 16 BRPM | TEMPERATURE: 98.1 F | SYSTOLIC BLOOD PRESSURE: 138 MMHG

## 2023-05-17 DIAGNOSIS — E34.8 PINEAL GLAND CYST: ICD-10-CM

## 2023-05-17 DIAGNOSIS — I67.1 CEREBRAL ANEURYSM: Primary | ICD-10-CM

## 2023-05-17 DIAGNOSIS — G93.89 CEREBRAL VENTRICULOMEGALY: ICD-10-CM

## 2023-05-17 PROCEDURE — 3079F DIAST BP 80-89 MM HG: CPT | Performed by: PHYSICIAN ASSISTANT

## 2023-05-17 PROCEDURE — 99203 OFFICE O/P NEW LOW 30 MIN: CPT | Performed by: PHYSICIAN ASSISTANT

## 2023-05-17 PROCEDURE — 1123F ACP DISCUSS/DSCN MKR DOCD: CPT | Performed by: PHYSICIAN ASSISTANT

## 2023-05-17 PROCEDURE — 3075F SYST BP GE 130 - 139MM HG: CPT | Performed by: PHYSICIAN ASSISTANT

## 2023-05-17 ASSESSMENT — ENCOUNTER SYMPTOMS
BACK PAIN: 1
PHOTOPHOBIA: 0
TROUBLE SWALLOWING: 0
ABDOMINAL PAIN: 0
SHORTNESS OF BREATH: 0

## 2023-05-17 NOTE — PROGRESS NOTES
Subjective:      Patient ID: Devan Hernandez is a 78 y.o. female. Mal Castro is an 78year old female with a past medical history of COPD, hx PE, colon cancer, and factor V Leiden currently taking coumadin. Patient presents to the office today as a new patient c/o right sided head pain/sensation. She is unable to to describe the sensation, but developed over the past several weeks. Patient is also experienced one episode of double vision while driving that lasted approximately 30 minutes. She does feel off balance and has some issues with short term memory, but denies urinary incontinence. 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 Denies loss of bowel or bladder, saddle anesthesia, pain down the legs, numbness, tingling,  loss of dexterity, abnormal gait, fever, chills, N/V, SOB, or chest pain. Off note, patient smokes 1 pack per week. Review of Systems   Constitutional:  Negative for fever and unexpected weight change. HENT:  Negative for trouble swallowing. Eyes:  Positive for visual disturbance. Negative for photophobia. Respiratory:  Negative for shortness of breath. Cardiovascular:  Negative for chest pain. Gastrointestinal:  Negative for abdominal pain. Endocrine: Negative for heat intolerance. Genitourinary:  Negative for flank pain. Musculoskeletal:  Positive for back pain and gait problem. Negative for myalgias and neck pain. Skin:  Negative for wound. Neurological:  Positive for headaches. Negative for weakness and numbness. Psychiatric/Behavioral:  Negative for confusion. Objective:   Physical Exam  Constitutional:       Appearance: Normal appearance. She is well-developed. HENT:      Head: Normocephalic and atraumatic. Eyes:      Extraocular Movements: Extraocular movements intact.       Conjunctiva/sclera: Conjunctivae normal.      Pupils: Pupils are equal,

## 2023-05-18 PROBLEM — H35.30 AGE-RELATED MACULAR DEGENERATION: Status: ACTIVE | Noted: 2023-05-18

## 2023-05-18 PROBLEM — H53.9 VISUAL DISTURBANCE: Status: ACTIVE | Noted: 2023-05-18

## 2023-05-18 PROBLEM — H26.9 NONSENILE CATARACT: Status: ACTIVE | Noted: 2023-05-18

## 2023-05-18 RX ORDER — DOXYCYCLINE HYCLATE 100 MG/1
100 CAPSULE ORAL 2 TIMES DAILY
COMMUNITY
Start: 2023-03-10

## 2023-05-18 RX ORDER — CEFDINIR 300 MG/1
CAPSULE ORAL
COMMUNITY

## 2023-05-18 RX ORDER — ROPINIROLE 0.25 MG/1
TABLET, FILM COATED ORAL
COMMUNITY

## 2023-05-18 RX ORDER — PREDNISOLONE ACETATE 10 MG/ML
SUSPENSION/ DROPS OPHTHALMIC
COMMUNITY
Start: 2023-05-05

## 2023-05-18 RX ORDER — OMEPRAZOLE 20 MG/1
CAPSULE, DELAYED RELEASE ORAL
COMMUNITY

## 2024-06-20 ENCOUNTER — HOSPITAL ENCOUNTER (INPATIENT)
Age: 84
LOS: 1 days | Discharge: HOME | DRG: 641 | End: 2024-06-22
Attending: EMERGENCY MEDICINE | Admitting: INTERNAL MEDICINE
Payer: MEDICARE

## 2024-06-20 DIAGNOSIS — R55 SYNCOPE AND COLLAPSE: Primary | ICD-10-CM

## 2024-06-20 DIAGNOSIS — N39.0 URINARY TRACT INFECTION WITHOUT HEMATURIA, SITE UNSPECIFIED: ICD-10-CM

## 2024-06-20 DIAGNOSIS — N28.9 ACUTE RENAL INSUFFICIENCY: ICD-10-CM

## 2024-06-20 PROCEDURE — 83605 ASSAY OF LACTIC ACID: CPT

## 2024-06-20 PROCEDURE — 84484 ASSAY OF TROPONIN QUANT: CPT

## 2024-06-20 PROCEDURE — 99285 EMERGENCY DEPT VISIT HI MDM: CPT

## 2024-06-20 PROCEDURE — 93005 ELECTROCARDIOGRAM TRACING: CPT | Performed by: EMERGENCY MEDICINE

## 2024-06-20 PROCEDURE — 85610 PROTHROMBIN TIME: CPT

## 2024-06-20 PROCEDURE — 85730 THROMBOPLASTIN TIME PARTIAL: CPT

## 2024-06-20 PROCEDURE — 85025 COMPLETE CBC W/AUTO DIFF WBC: CPT

## 2024-06-20 PROCEDURE — 80053 COMPREHEN METABOLIC PANEL: CPT

## 2024-06-20 PROCEDURE — 83690 ASSAY OF LIPASE: CPT

## 2024-06-20 RX ORDER — 0.9 % SODIUM CHLORIDE 0.9 %
1000 INTRAVENOUS SOLUTION INTRAVENOUS ONCE
Status: COMPLETED | OUTPATIENT
Start: 2024-06-20 | End: 2024-06-21

## 2024-06-20 ASSESSMENT — ENCOUNTER SYMPTOMS
CONSTIPATION: 1
DIARRHEA: 0
NAUSEA: 0
BACK PAIN: 0
ABDOMINAL DISTENTION: 0
WHEEZING: 0
EYE PAIN: 0
SINUS PRESSURE: 0
EYE DISCHARGE: 0
SHORTNESS OF BREATH: 0
ABDOMINAL PAIN: 1
VOMITING: 0
SORE THROAT: 0
EYE REDNESS: 0
COUGH: 0

## 2024-06-21 ENCOUNTER — APPOINTMENT (OUTPATIENT)
Dept: ULTRASOUND IMAGING | Age: 84
DRG: 641 | End: 2024-06-21
Payer: MEDICARE

## 2024-06-21 ENCOUNTER — APPOINTMENT (OUTPATIENT)
Dept: CT IMAGING | Age: 84
DRG: 641 | End: 2024-06-21
Payer: MEDICARE

## 2024-06-21 ENCOUNTER — APPOINTMENT (OUTPATIENT)
Age: 84
DRG: 641 | End: 2024-06-21
Payer: MEDICARE

## 2024-06-21 LAB
ALBUMIN SERPL-MCNC: 4.7 G/DL (ref 3.5–5.2)
ALP SERPL-CCNC: 134 U/L (ref 35–104)
ALT SERPL-CCNC: 15 U/L (ref 0–32)
ANION GAP SERPL CALCULATED.3IONS-SCNC: 10 MMOL/L (ref 7–16)
AST SERPL-CCNC: 22 U/L (ref 0–31)
BACTERIA URNS QL MICRO: ABNORMAL
BASOPHILS # BLD: 0.06 K/UL (ref 0–0.2)
BASOPHILS NFR BLD: 0 % (ref 0–2)
BILIRUB SERPL-MCNC: 0.4 MG/DL (ref 0–1.2)
BILIRUB UR QL STRIP: ABNORMAL
BUN SERPL-MCNC: 34 MG/DL (ref 6–23)
CALCIUM SERPL-MCNC: 10.6 MG/DL (ref 8.6–10.2)
CASTS #/AREA URNS LPF: ABNORMAL /LPF
CHLORIDE SERPL-SCNC: 100 MMOL/L (ref 98–107)
CLARITY UR: ABNORMAL
CO2 SERPL-SCNC: 24 MMOL/L (ref 22–29)
COLOR UR: YELLOW
CREAT SERPL-MCNC: 1 MG/DL (ref 0.5–1)
CREAT SERPL-MCNC: 1.3 MG/DL (ref 0.5–1)
CRYSTALS URNS MICRO: ABNORMAL /HPF
ECHO AR MAX VEL PISA: 3.9 M/S
ECHO AV AREA PEAK VELOCITY: 1.8 CM2
ECHO AV AREA VTI: 1.9 CM2
ECHO AV AREA/BSA PEAK VELOCITY: 1.2 CM2/M2
ECHO AV AREA/BSA VTI: 1.2 CM2/M2
ECHO AV MEAN GRADIENT: 17 MMHG
ECHO AV MEAN VELOCITY: 2 M/S
ECHO AV PEAK GRADIENT: 28 MMHG
ECHO AV PEAK VELOCITY: 2.7 M/S
ECHO AV REGURGITANT PHT: 750.1 MILLISECOND
ECHO AV VELOCITY RATIO: 0.63
ECHO AV VTI: 67.8 CM
ECHO BSA: 1.56 M2
ECHO EST RA PRESSURE: 3 MMHG
ECHO LA DIAMETER INDEX: 2.12 CM/M2
ECHO LA DIAMETER: 3.3 CM
ECHO LA VOL A-L A2C: 29 ML (ref 22–52)
ECHO LA VOL A-L A4C: 42 ML (ref 22–52)
ECHO LA VOL BP: 33 ML (ref 22–52)
ECHO LA VOL MOD A2C: 28 ML (ref 22–52)
ECHO LA VOL MOD A4C: 40 ML (ref 22–52)
ECHO LA VOL/BSA BIPLANE: 21 ML/M2 (ref 16–34)
ECHO LA VOLUME AREA LENGTH: 35 ML
ECHO LA VOLUME INDEX A-L A2C: 19 ML/M2 (ref 16–34)
ECHO LA VOLUME INDEX A-L A4C: 27 ML/M2 (ref 16–34)
ECHO LA VOLUME INDEX AREA LENGTH: 22 ML/M2 (ref 16–34)
ECHO LA VOLUME INDEX MOD A2C: 18 ML/M2 (ref 16–34)
ECHO LA VOLUME INDEX MOD A4C: 26 ML/M2 (ref 16–34)
ECHO LV EDV A2C: 52 ML
ECHO LV EDV A4C: 61 ML
ECHO LV EDV BP: 59 ML (ref 56–104)
ECHO LV EDV INDEX A4C: 39 ML/M2
ECHO LV EDV INDEX BP: 38 ML/M2
ECHO LV EDV NDEX A2C: 33 ML/M2
ECHO LV EJECTION FRACTION A2C: 66 %
ECHO LV EJECTION FRACTION A4C: 71 %
ECHO LV EJECTION FRACTION BIPLANE: 69 % (ref 55–100)
ECHO LV ESV A2C: 18 ML
ECHO LV ESV A4C: 17 ML
ECHO LV ESV BP: 18 ML (ref 19–49)
ECHO LV ESV INDEX A2C: 12 ML/M2
ECHO LV ESV INDEX A4C: 11 ML/M2
ECHO LV ESV INDEX BP: 12 ML/M2
ECHO LV FRACTIONAL SHORTENING: 38 % (ref 28–44)
ECHO LV INTERNAL DIMENSION DIASTOLE INDEX: 1.86 CM/M2
ECHO LV INTERNAL DIMENSION DIASTOLIC: 2.9 CM (ref 3.9–5.3)
ECHO LV INTERNAL DIMENSION SYSTOLIC INDEX: 1.15 CM/M2
ECHO LV INTERNAL DIMENSION SYSTOLIC: 1.8 CM
ECHO LV ISOVOLUMETRIC RELAXATION TIME (IVRT): 140.8 MS
ECHO LV IVSD: 1.2 CM (ref 0.6–0.9)
ECHO LV MASS 2D: 84.3 G (ref 67–162)
ECHO LV MASS INDEX 2D: 54 G/M2 (ref 43–95)
ECHO LV POSTERIOR WALL DIASTOLIC: 0.9 CM (ref 0.6–0.9)
ECHO LV RELATIVE WALL THICKNESS RATIO: 0.62
ECHO LVOT AREA: 2.8 CM2
ECHO LVOT AV VTI INDEX: 0.66
ECHO LVOT DIAM: 1.9 CM
ECHO LVOT MEAN GRADIENT: 7 MMHG
ECHO LVOT PEAK GRADIENT: 11 MMHG
ECHO LVOT PEAK VELOCITY: 1.7 M/S
ECHO LVOT STROKE VOLUME INDEX: 81.2 ML/M2
ECHO LVOT SV: 126.7 ML
ECHO LVOT VTI: 44.7 CM
ECHO MV "A" WAVE DURATION: 182.7 MSEC
ECHO MV A VELOCITY: 1.42 M/S
ECHO MV AREA PHT: 2.5 CM2
ECHO MV AREA VTI: 2.9 CM2
ECHO MV E DECELERATION TIME (DT): 346.4 MS
ECHO MV E VELOCITY: 0.99 M/S
ECHO MV E/A RATIO: 0.7
ECHO MV LVOT VTI INDEX: 0.97
ECHO MV MAX VELOCITY: 1.5 M/S
ECHO MV MEAN GRADIENT: 3 MMHG
ECHO MV MEAN VELOCITY: 0.8 M/S
ECHO MV PEAK GRADIENT: 8 MMHG
ECHO MV PRESSURE HALF TIME (PHT): 88.5 MS
ECHO MV VTI: 43.2 CM
ECHO PV MAX VELOCITY: 1 M/S
ECHO PV MEAN GRADIENT: 2 MMHG
ECHO PV MEAN VELOCITY: 0.6 M/S
ECHO PV PEAK GRADIENT: 4 MMHG
ECHO PV VTI: 17.6 CM
ECHO PVEIN A DURATION: 137 MS
ECHO PVEIN A VELOCITY: 0.3 M/S
ECHO PVEIN PEAK D VELOCITY: 0.4 M/S
ECHO PVEIN PEAK S VELOCITY: 0.7 M/S
ECHO PVEIN S/D RATIO: 1.8
ECHO RIGHT VENTRICULAR SYSTOLIC PRESSURE (RVSP): 31 MMHG
ECHO RV INTERNAL DIMENSION: 2.7 CM
ECHO RV LONGITUDINAL DIMENSION: 6 CM
ECHO RV MID DIMENSION: 3.1 CM
ECHO TV REGURGITANT MAX VELOCITY: 2.65 M/S
ECHO TV REGURGITANT PEAK GRADIENT: 28 MMHG
EOSINOPHIL # BLD: 0.02 K/UL (ref 0.05–0.5)
EOSINOPHILS RELATIVE PERCENT: 0 % (ref 0–6)
EPI CELLS #/AREA URNS HPF: ABNORMAL /HPF
ERYTHROCYTE [DISTWIDTH] IN BLOOD BY AUTOMATED COUNT: 15.6 % (ref 11.5–15)
GFR, ESTIMATED: 39 ML/MIN/1.73M2
GFR, ESTIMATED: 53 ML/MIN/1.73M2
GLUCOSE SERPL-MCNC: 123 MG/DL (ref 74–99)
GLUCOSE UR STRIP-MCNC: NEGATIVE MG/DL
HCT VFR BLD AUTO: 49.1 % (ref 34–48)
HGB BLD-MCNC: 15.8 G/DL (ref 11.5–15.5)
HGB UR QL STRIP.AUTO: NEGATIVE
IMM GRANULOCYTES # BLD AUTO: 0.08 K/UL (ref 0–0.58)
IMM GRANULOCYTES NFR BLD: 1 % (ref 0–5)
INR PPP: 1
KETONES UR STRIP-MCNC: ABNORMAL MG/DL
LACTATE BLDV-SCNC: 0.9 MMOL/L (ref 0.5–2.2)
LEUKOCYTE ESTERASE UR QL STRIP: ABNORMAL
LIPASE SERPL-CCNC: 58 U/L (ref 13–60)
LYMPHOCYTES NFR BLD: 0.68 K/UL (ref 1.5–4)
LYMPHOCYTES RELATIVE PERCENT: 5 % (ref 20–42)
MCH RBC QN AUTO: 29.2 PG (ref 26–35)
MCHC RBC AUTO-ENTMCNC: 32.2 G/DL (ref 32–34.5)
MCV RBC AUTO: 90.8 FL (ref 80–99.9)
MONOCYTES NFR BLD: 0.49 K/UL (ref 0.1–0.95)
MONOCYTES NFR BLD: 3 % (ref 2–12)
NEUTROPHILS NFR BLD: 91 % (ref 43–80)
NEUTS SEG NFR BLD: 13.04 K/UL (ref 1.8–7.3)
NITRITE UR QL STRIP: POSITIVE
PARTIAL THROMBOPLASTIN TIME: 33.6 SEC (ref 24.5–35.1)
PH UR STRIP: 5.5 [PH] (ref 5–9)
PLATELET # BLD AUTO: 191 K/UL (ref 130–450)
PMV BLD AUTO: 11 FL (ref 7–12)
POTASSIUM SERPL-SCNC: 5.1 MMOL/L (ref 3.5–5)
PROT SERPL-MCNC: 7.6 G/DL (ref 6.4–8.3)
PROT UR STRIP-MCNC: 30 MG/DL
PROTHROMBIN TIME: 11 SEC (ref 9.3–12.4)
RBC # BLD AUTO: 5.41 M/UL (ref 3.5–5.5)
RBC #/AREA URNS HPF: ABNORMAL /HPF
SODIUM SERPL-SCNC: 134 MMOL/L (ref 132–146)
SP GR UR STRIP: >1.03 (ref 1–1.03)
TROPONIN I SERPL HS-MCNC: 12 NG/L (ref 0–9)
TROPONIN I SERPL HS-MCNC: 13 NG/L (ref 0–9)
UROBILINOGEN UR STRIP-ACNC: 0.2 EU/DL (ref 0–1)
WBC #/AREA URNS HPF: ABNORMAL /HPF
WBC OTHER # BLD: 14.4 K/UL (ref 4.5–11.5)

## 2024-06-21 PROCEDURE — 71275 CT ANGIOGRAPHY CHEST: CPT

## 2024-06-21 PROCEDURE — 1200000000 HC SEMI PRIVATE

## 2024-06-21 PROCEDURE — 6360000004 HC RX CONTRAST MEDICATION: Performed by: RADIOLOGY

## 2024-06-21 PROCEDURE — 6360000002 HC RX W HCPCS: Performed by: EMERGENCY MEDICINE

## 2024-06-21 PROCEDURE — 6370000000 HC RX 637 (ALT 250 FOR IP)

## 2024-06-21 PROCEDURE — 93880 EXTRACRANIAL BILAT STUDY: CPT

## 2024-06-21 PROCEDURE — 87086 URINE CULTURE/COLONY COUNT: CPT

## 2024-06-21 PROCEDURE — 93306 TTE W/DOPPLER COMPLETE: CPT

## 2024-06-21 PROCEDURE — 2580000003 HC RX 258: Performed by: EMERGENCY MEDICINE

## 2024-06-21 PROCEDURE — 72125 CT NECK SPINE W/O DYE: CPT

## 2024-06-21 PROCEDURE — 93306 TTE W/DOPPLER COMPLETE: CPT | Performed by: INTERNAL MEDICINE

## 2024-06-21 PROCEDURE — 2580000003 HC RX 258

## 2024-06-21 PROCEDURE — 74177 CT ABD & PELVIS W/CONTRAST: CPT

## 2024-06-21 PROCEDURE — 70450 CT HEAD/BRAIN W/O DYE: CPT

## 2024-06-21 PROCEDURE — 84484 ASSAY OF TROPONIN QUANT: CPT

## 2024-06-21 PROCEDURE — 81001 URINALYSIS AUTO W/SCOPE: CPT

## 2024-06-21 PROCEDURE — 96374 THER/PROPH/DIAG INJ IV PUSH: CPT

## 2024-06-21 PROCEDURE — 6360000002 HC RX W HCPCS

## 2024-06-21 PROCEDURE — 82565 ASSAY OF CREATININE: CPT

## 2024-06-21 RX ORDER — VIT A/VIT C/VIT E/ZINC/COPPER 4296-226
1 CAPSULE ORAL DAILY
COMMUNITY

## 2024-06-21 RX ORDER — PREDNISOLONE ACETATE 10 MG/ML
1 SUSPENSION/ DROPS OPHTHALMIC 4 TIMES DAILY
Status: DISCONTINUED | OUTPATIENT
Start: 2024-06-21 | End: 2024-06-22 | Stop reason: HOSPADM

## 2024-06-21 RX ORDER — AMLODIPINE BESYLATE 5 MG/1
5 TABLET ORAL DAILY
Status: DISCONTINUED | OUTPATIENT
Start: 2024-06-21 | End: 2024-06-22 | Stop reason: HOSPADM

## 2024-06-21 RX ORDER — ROPINIROLE 0.25 MG/1
0.25 TABLET, FILM COATED ORAL EVERY EVENING
Status: DISCONTINUED | OUTPATIENT
Start: 2024-06-21 | End: 2024-06-22 | Stop reason: HOSPADM

## 2024-06-21 RX ORDER — MECOBALAMIN 5000 MCG
5 TABLET,DISINTEGRATING ORAL NIGHTLY PRN
Status: DISCONTINUED | OUTPATIENT
Start: 2024-06-21 | End: 2024-06-22 | Stop reason: HOSPADM

## 2024-06-21 RX ORDER — VITAMIN B COMPLEX
2000 TABLET ORAL DAILY
Status: DISCONTINUED | OUTPATIENT
Start: 2024-06-21 | End: 2024-06-22 | Stop reason: HOSPADM

## 2024-06-21 RX ORDER — LISINOPRIL 10 MG/1
10 TABLET ORAL DAILY
Status: DISCONTINUED | OUTPATIENT
Start: 2024-06-21 | End: 2024-06-22 | Stop reason: HOSPADM

## 2024-06-21 RX ORDER — SODIUM CHLORIDE 9 MG/ML
INJECTION, SOLUTION INTRAVENOUS CONTINUOUS
Status: DISCONTINUED | OUTPATIENT
Start: 2024-06-21 | End: 2024-06-22 | Stop reason: HOSPADM

## 2024-06-21 RX ORDER — PANTOPRAZOLE SODIUM 40 MG/1
40 TABLET, DELAYED RELEASE ORAL 2 TIMES DAILY
Status: DISCONTINUED | OUTPATIENT
Start: 2024-06-21 | End: 2024-06-22 | Stop reason: HOSPADM

## 2024-06-21 RX ORDER — ENOXAPARIN SODIUM 100 MG/ML
30 INJECTION SUBCUTANEOUS DAILY
Status: DISCONTINUED | OUTPATIENT
Start: 2024-06-21 | End: 2024-06-22 | Stop reason: HOSPADM

## 2024-06-21 RX ADMIN — Medication 2000 UNITS: at 16:28

## 2024-06-21 RX ADMIN — ROPINIROLE HYDROCHLORIDE 0.25 MG: 0.25 TABLET, FILM COATED ORAL at 23:08

## 2024-06-21 RX ADMIN — ENOXAPARIN SODIUM 30 MG: 100 INJECTION SUBCUTANEOUS at 12:13

## 2024-06-21 RX ADMIN — SODIUM CHLORIDE: 9 INJECTION, SOLUTION INTRAVENOUS at 20:08

## 2024-06-21 RX ADMIN — CEFTRIAXONE SODIUM 2000 MG: 2 INJECTION, POWDER, FOR SOLUTION INTRAMUSCULAR; INTRAVENOUS at 03:35

## 2024-06-21 RX ADMIN — IOPAMIDOL 80 ML: 755 INJECTION, SOLUTION INTRAVENOUS at 03:19

## 2024-06-21 RX ADMIN — SODIUM CHLORIDE: 9 INJECTION, SOLUTION INTRAVENOUS at 06:55

## 2024-06-21 RX ADMIN — SODIUM CHLORIDE 1000 ML: 9 INJECTION, SOLUTION INTRAVENOUS at 01:53

## 2024-06-21 RX ADMIN — PANTOPRAZOLE SODIUM 40 MG: 40 TABLET, DELAYED RELEASE ORAL at 12:12

## 2024-06-21 RX ADMIN — Medication 5 MG: at 23:08

## 2024-06-21 RX ADMIN — AMLODIPINE BESYLATE 5 MG: 5 TABLET ORAL at 12:11

## 2024-06-21 NOTE — ED PROVIDER NOTES
Patient is an 85 y/o female who presents to the ED after a syncopal episode at home. Patient states that she was sitting on the toilet attempting to have a bowel movement when she passed out. She states that she did hit her head on the floor. She did have a bowel movement. She reports diffuse abdominal pain. She denies any chest pain, palpitations or shortness of breath. Family checked her blood pressure and it was 81 systolic.          Review of Systems   Constitutional:  Negative for chills and fever.   HENT:  Negative for ear pain, sinus pressure and sore throat.    Eyes:  Negative for pain, discharge and redness.   Respiratory:  Negative for cough, shortness of breath and wheezing.    Cardiovascular:  Negative for chest pain.   Gastrointestinal:  Positive for abdominal pain and constipation. Negative for abdominal distention, diarrhea, nausea and vomiting.   Genitourinary:  Negative for dysuria and frequency.   Musculoskeletal:  Negative for arthralgias and back pain.   Skin:  Negative for rash and wound.   Neurological:  Positive for syncope. Negative for weakness and headaches.   Hematological:  Negative for adenopathy.   All other systems reviewed and are negative.       Physical Exam  Vitals and nursing note reviewed.   Constitutional:       General: She is not in acute distress.  HENT:      Head: Normocephalic.      Comments: Abrasion right forehead     Right Ear: External ear normal.      Left Ear: External ear normal.      Nose: Nose normal.      Mouth/Throat:      Mouth: Mucous membranes are moist.   Eyes:      Conjunctiva/sclera: Conjunctivae normal.      Pupils: Pupils are equal, round, and reactive to light.   Cardiovascular:      Rate and Rhythm: Normal rate and regular rhythm.      Heart sounds: No murmur heard.  Pulmonary:      Effort: Pulmonary effort is normal. No respiratory distress.      Breath sounds: Normal breath sounds. No stridor. No wheezing, rhonchi or rales.   Abdominal:       telemetry  Patient's condition is stable.            Armin Leigh DO  06/21/24 0507

## 2024-06-21 NOTE — ED NOTES
Sleeping with unlabored respirations-cardiac monitor maintained-side rails x 2 call light in reach.

## 2024-06-21 NOTE — PROGRESS NOTES
Pharmacist Review and Automatic Dose Adjustment of Prophylactic Enoxaparin    Reviewed reason(s) for admission/hospital problem list    The reviewing pharmacist has made an adjustment to the ordered enoxaparin dose or converted to UFH per the approved [facility] protocol and table as identified below.        Felicia Smith is a 84 y.o. female.     Recent Labs     06/20/24  2349   CREATININE 1.3*       Estimated Creatinine Clearance: 26 mL/min (A) (based on SCr of 1.3 mg/dL (H)).    Recent Labs     06/20/24  2349   HGB 15.8*   HCT 49.1*        Recent Labs     06/20/24  2349   INR 1.0       Height:   Ht Readings from Last 1 Encounters:   06/21/24 1.6 m (5' 3\")     Weight:  Wt Readings from Last 1 Encounters:   06/21/24 54.4 kg (120 lb)               Plan: Based upon the patient's weight and renal function    Ordered: Enoxaparin 40mg SUBQ Daily    Changed/converted to    New Order: Enoxaparin 30mg SUBQ Daily      Thank you,  Jazmyn Stubbs, [facility]  6/21/2024, 9:00 AM   [phone]

## 2024-06-22 VITALS
RESPIRATION RATE: 16 BRPM | HEART RATE: 55 BPM | OXYGEN SATURATION: 99 % | BODY MASS INDEX: 21.26 KG/M2 | HEIGHT: 63 IN | DIASTOLIC BLOOD PRESSURE: 75 MMHG | SYSTOLIC BLOOD PRESSURE: 156 MMHG | WEIGHT: 120 LBS | TEMPERATURE: 98 F

## 2024-06-22 LAB
25(OH)D3 SERPL-MCNC: 26.4 NG/ML (ref 30–100)
ALBUMIN SERPL-MCNC: 3.7 G/DL (ref 3.5–5.2)
ALP SERPL-CCNC: 99 U/L (ref 35–104)
ALT SERPL-CCNC: 11 U/L (ref 0–32)
ANION GAP SERPL CALCULATED.3IONS-SCNC: 9 MMOL/L (ref 7–16)
AST SERPL-CCNC: 16 U/L (ref 0–31)
BASOPHILS # BLD: 0.04 K/UL (ref 0–0.2)
BASOPHILS NFR BLD: 1 % (ref 0–2)
BILIRUB SERPL-MCNC: 0.3 MG/DL (ref 0–1.2)
BUN SERPL-MCNC: 20 MG/DL (ref 6–23)
CALCIUM SERPL-MCNC: 9.5 MG/DL (ref 8.6–10.2)
CHLORIDE SERPL-SCNC: 109 MMOL/L (ref 98–107)
CHOLEST SERPL-MCNC: 203 MG/DL
CO2 SERPL-SCNC: 24 MMOL/L (ref 22–29)
CREAT SERPL-MCNC: 0.8 MG/DL (ref 0.5–1)
EKG ATRIAL RATE: 61 BPM
EKG P-R INTERVAL: 168 MS
EKG Q-T INTERVAL: 446 MS
EKG QRS DURATION: 94 MS
EKG QTC CALCULATION (BAZETT): 448 MS
EKG R AXIS: -46 DEGREES
EKG T AXIS: 160 DEGREES
EKG VENTRICULAR RATE: 61 BPM
EOSINOPHIL # BLD: 0.12 K/UL (ref 0.05–0.5)
EOSINOPHILS RELATIVE PERCENT: 3 % (ref 0–6)
ERYTHROCYTE [DISTWIDTH] IN BLOOD BY AUTOMATED COUNT: 15.7 % (ref 11.5–15)
FOLATE SERPL-MCNC: 18.3 NG/ML (ref 4.8–24.2)
GFR, ESTIMATED: 68 ML/MIN/1.73M2
GLUCOSE SERPL-MCNC: 86 MG/DL (ref 74–99)
HCT VFR BLD AUTO: 44.1 % (ref 34–48)
HDLC SERPL-MCNC: 54 MG/DL
HGB BLD-MCNC: 14.2 G/DL (ref 11.5–15.5)
IMM GRANULOCYTES # BLD AUTO: <0.03 K/UL (ref 0–0.58)
IMM GRANULOCYTES NFR BLD: 0 % (ref 0–5)
IRON SATN MFR SERPL: 37 % (ref 15–50)
IRON SERPL-MCNC: 105 UG/DL (ref 37–145)
LDLC SERPL CALC-MCNC: 118 MG/DL
LYMPHOCYTES NFR BLD: 1.15 K/UL (ref 1.5–4)
LYMPHOCYTES RELATIVE PERCENT: 28 % (ref 20–42)
MCH RBC QN AUTO: 29.7 PG (ref 26–35)
MCHC RBC AUTO-ENTMCNC: 32.2 G/DL (ref 32–34.5)
MCV RBC AUTO: 92.3 FL (ref 80–99.9)
MICROORGANISM SPEC CULT: ABNORMAL
MONOCYTES NFR BLD: 0.32 K/UL (ref 0.1–0.95)
MONOCYTES NFR BLD: 8 % (ref 2–12)
NEUTROPHILS NFR BLD: 61 % (ref 43–80)
NEUTS SEG NFR BLD: 2.52 K/UL (ref 1.8–7.3)
PLATELET # BLD AUTO: 165 K/UL (ref 130–450)
PMV BLD AUTO: 11.4 FL (ref 7–12)
POTASSIUM SERPL-SCNC: 4.2 MMOL/L (ref 3.5–5)
PROT SERPL-MCNC: 5.9 G/DL (ref 6.4–8.3)
RBC # BLD AUTO: 4.78 M/UL (ref 3.5–5.5)
SERVICE CMNT-IMP: ABNORMAL
SODIUM SERPL-SCNC: 142 MMOL/L (ref 132–146)
SPECIMEN DESCRIPTION: ABNORMAL
T4 FREE SERPL-MCNC: 1.2 NG/DL (ref 0.9–1.7)
TIBC SERPL-MCNC: 281 UG/DL (ref 250–450)
TRIGL SERPL-MCNC: 155 MG/DL
TROPONIN I SERPL HS-MCNC: 12 NG/L (ref 0–9)
TSH SERPL DL<=0.05 MIU/L-ACNC: 1.4 UIU/ML (ref 0.27–4.2)
VIT B12 SERPL-MCNC: 428 PG/ML (ref 211–946)
VLDLC SERPL CALC-MCNC: 31 MG/DL
WBC OTHER # BLD: 4.2 K/UL (ref 4.5–11.5)

## 2024-06-22 PROCEDURE — 36415 COLL VENOUS BLD VENIPUNCTURE: CPT

## 2024-06-22 PROCEDURE — 82306 VITAMIN D 25 HYDROXY: CPT

## 2024-06-22 PROCEDURE — 82746 ASSAY OF FOLIC ACID SERUM: CPT

## 2024-06-22 PROCEDURE — 84439 ASSAY OF FREE THYROXINE: CPT

## 2024-06-22 PROCEDURE — 83540 ASSAY OF IRON: CPT

## 2024-06-22 PROCEDURE — 80053 COMPREHEN METABOLIC PANEL: CPT

## 2024-06-22 PROCEDURE — 84484 ASSAY OF TROPONIN QUANT: CPT

## 2024-06-22 PROCEDURE — 85025 COMPLETE CBC W/AUTO DIFF WBC: CPT

## 2024-06-22 PROCEDURE — 93010 ELECTROCARDIOGRAM REPORT: CPT | Performed by: INTERNAL MEDICINE

## 2024-06-22 PROCEDURE — 6370000000 HC RX 637 (ALT 250 FOR IP)

## 2024-06-22 PROCEDURE — 83550 IRON BINDING TEST: CPT

## 2024-06-22 PROCEDURE — 82607 VITAMIN B-12: CPT

## 2024-06-22 PROCEDURE — 93005 ELECTROCARDIOGRAM TRACING: CPT

## 2024-06-22 PROCEDURE — 84443 ASSAY THYROID STIM HORMONE: CPT

## 2024-06-22 PROCEDURE — 80061 LIPID PANEL: CPT

## 2024-06-22 PROCEDURE — 6360000002 HC RX W HCPCS

## 2024-06-22 PROCEDURE — 2580000003 HC RX 258

## 2024-06-22 RX ORDER — ROPINIROLE 0.25 MG/1
TABLET, FILM COATED ORAL
Qty: 90 TABLET | COMMUNITY
Start: 2024-06-22

## 2024-06-22 RX ADMIN — AMLODIPINE BESYLATE 5 MG: 5 TABLET ORAL at 08:53

## 2024-06-22 RX ADMIN — WATER 1000 MG: 1 INJECTION INTRAMUSCULAR; INTRAVENOUS; SUBCUTANEOUS at 04:18

## 2024-06-22 RX ADMIN — Medication 2000 UNITS: at 08:53

## 2024-06-22 RX ADMIN — PANTOPRAZOLE SODIUM 40 MG: 40 TABLET, DELAYED RELEASE ORAL at 08:53

## 2024-06-22 RX ADMIN — ENOXAPARIN SODIUM 30 MG: 100 INJECTION SUBCUTANEOUS at 08:53

## 2024-06-22 NOTE — PROGRESS NOTES
4 Eyes Skin Assessment     NAME:  Felicia Smith  YOB: 1940  MEDICAL RECORD NUMBER:  [id number]    The patient is being assessed for  Admission    I agree that at least one RN has performed a thorough Head to Toe Skin Assessment on the patient. ALL assessment sites listed below have been assessed.      Areas assessed by both nurses:    Head, Face, Ears, Shoulders, Back, Chest, Arms, Elbows, Hands, Sacrum. Buttock, Coccyx, Ischium, Legs. Feet and Heels, and Under Medical Devices         Does the Patient have a Wound? No noted wound(s)       Malcolm Prevention initiated by RN: No  Wound Care Orders initiated by RN: No    Pressure Injury (Stage 3,4, Unstageable, DTI, NWPT, and Complex wounds) if present, place Wound referral order by RN under : No    New Ostomies, if present place, Ostomy referral order under : No     Nurse 1 eSignature: Electronically signed by Marita Ellis RN on 6/22/24 at 5:01 AM EDT    **SHARE this note so that the co-signing nurse can place an eSignature**    Nurse 2 eSignature: {Esignature:[id number]}

## 2024-06-22 NOTE — DISCHARGE INSTRUCTIONS
Your information:  Name: Felicia Smith  : 1940    Your instructions:    YOU ARE BEING DISCHARGED HOME. PLEASE MAKE AND KEEP YOUR FOLLOW UP APPOINTMENTS.    IF YOU EXPERIENCE ANY OF THE FOLLOWING SYMPTOMS, CHEST PAIN, SHORTNESS OF BREATH, COUGHING UP BLOOD OR BLOODY SPUTUM, STOMACH PAIN OR CRAMPING, DARK, TARRY STOOLS, LOSS OF APPETITE, GENERAL NOT FEELING WELL, SIGNS AND SYMPTOMS OF INFECTION LIKE FEVER AND OR CHILLS, PLEASE CALL PCP OR RETURN TO THE EMERGENCY ROOM.      What to do after you leave the hospital:    Recommended diet: regular diet    Recommended activity: activity as tolerated        The following personal items were collected during your admission and were returned to you:    Belongings  Dental Appliances: None  Vision - Corrective Lenses: Eyeglasses  Hearing Aid: None  Clothing: Footwear, Shirt, Pants, Undergarments  Jewelry: None  Electronic Devices: Cell Phone  Weapons (Notify Protective Services/Security): None  Home Medications: None  Valuables Given To: Patient  Provide Name(s) of Who Valuable(s) Were Given To: pt    Information obtained by:  By signing below, I understand that if any problems occur once I leave the hospital I am to contact PCP.  I understand and acknowledge receipt of the instructions indicated above.

## 2024-06-22 NOTE — H&P
[address] [address]                           HISTORY & PHYSICAL      PATIENT NAME: RICHARD NGUYEN             : 1940  MED REC NO: [id number]                        ROOM: 0430  ACCOUNT NO: [id number]                       ADMIT DATE: 2024  PROVIDER: Bijan Amaya DO      CHIEF COMPLAINT AND HISTORY OF CHIEF COMPLAINT:  This pleasant 84-year-old white female who brought into the emergency room after apparent syncopal episode on 2024.  The patient was sitting on her toilet, attempting to have a bowel movement when she passed out.  She states she did hit her head on the floor.  The patient did have a bowel movement.  The patient has been having some diffuse abdominal pain with diarrhea.  The patient was admitted to the hospital here, was noted at that time that her blood pressure was low with systolic of 81.  The patient was seen and evaluated in the emergency room.  Radiographic findings at that time included the following.  A CT of the abdomen and pelvis did suggest liquid contents throughout suggesting gastroenteritis.  CT of the cervical spine was unremarkable.  CT of the head was normal.  Evidence of possible urinary tract infection could not be excluded.  The patient was admitted to the hospital to the general telemetry floor with diagnosis of syncopal episode and dehydration.  The patient was seen in the emergency room.  Exam at this time revealed a pleasant 84-year-old white female.  The patient had improved since she has received fluid administration from a cardiac standpoint of view.  The patient does have a history of nonsustained ventricular tachycardia in the past and has followed up with Dr. Vu.  The patient also has a prior history of factor V Leiden deficiency, has been on anticoagulant therapy but this has been discontinued.  From a cardiac standpoint of view, she had an echocardiogram  pounds, BMI 21.26.  Blood pressure is 144/91, pulse 80, respirations 16, afebrile.  GENERAL APPEARANCE:  84-year-old white female who is alert, responsive, oriented to person, place, and time.  HEENT:  Normal.  NECK:  With adequate carotid upstroke without bruits.  Trachea midline.  Thyroid not palpable.  LUNGS:  Clear.  HEART:  Fairly regular, grade 1/6 murmur.  No S3, no S4.  ABDOMEN:  Soft.  EXTREMITIES:  No cyanosis, clubbing, or edema.    IMPRESSION:  At this time include:  1. Syncopal episode, probably vasovagal.  2. Acute renal insufficiency with GFR currently 3A, probably secondary to dehydration.  3. Essential hypertension.  4. Nonspecific urinary tract infection.  5. History of valvular heart disease consisting of normal ejection fraction 65% to 70%, mild-to-moderate mitral regurgitation, mild tricuspid regurgitation, and finding of mild aortic stenosis.  6. History of non-sustained ventricular tachycardia.  The EKG does show normal sinus rhythm.  7. Factor V Leiden mutation, currently off anticoagulant therapy.  8. History of rectal prolapse with tenesmus and use of digital disimpaction to facilitate bowel movement.  9. Vitamin D deficiency.    PLAN AND RECOMMENDATION:  At this time, currently, the patient appears stable.  The patient has had multiple abdominal surgeries at this time, we will place on a stool softener.  The patient has seen Dr. Vu in the past.  She will be consulted at this time.  Echocardiogram will be obtained.  We will follow the troponin, EKG, check orthostatics, and make further recommendation as clinically indicated.  Pending results of further lab at this time, will dictate further care and treatment.          ANISHA GUERRIER DO      D:  06/21/2024 17:12:10     T:  06/21/2024 20:52:25     [facility]/S  Job #:  [id number]     Doc#:  [id number]

## 2024-06-22 NOTE — CONSULTS
Consult Note    Reason for Consult: Syncope.    Requesting Physician:  Bijan Amaya DO    HISTORY OF PRESENT ILLNESS:    The patient is a 84 y.o. female who I saw in the past in my office.  I need to review her record in my office.    She presented to the hospital with what appears to be syncopal episode.  She said that she was sitting on her toilet attempting to have bowel movement when she passed out.  She felt lightheaded just before she passed out.  She woke up to find herself on the floor.  She thought she was out for sure.  Of time.  She did not seek medical attention right away as she was busy cleaning the mess around her.    She then decided to seek medical attention.  She came to the hospital.      She had CT of the abdomen and pelvis showing findings consistent with gastroenteritis.    Patient was admitted.  Cardiac consult was requested in view of her syncopal episode.    She had echocardiogram done this admission reporting normal left ventricular systolic function with mild aortic stenosis.    Patient was lying semiupright in bed when I came to see her.  She was alert and awake.  She stated that she wants to go home.    Patient does not recall having complaints of chest pain palpitation or shortness of breath prior to her syncope.    She thought she had similar syncopal episode about 2 years ago.    She admitted smoking few cigarettes a day.          Past Medical History:   Diagnosis Date    Anesthesia     with rectal repair had spinal heart stopped    Anesthesia complication     no spinals heart stopped    Arthritis     Cancer (HCC) 1994    colon    COPD (chronic obstructive pulmonary disease) ([facility])     GERD (gastroesophageal reflux disease)     H/O factor V Leiden mutation     on Coumadin    Hiatal hernia     Hx of blood clots     Hx of mammogram     probably last year per patient    Hyperlipidemia     Hypertension     Phlebitis     Pulmonary embolism (HCC)        Past Surgical History:   Procedure  Pay for Housing in the Last Year: No     Number of Places Lived in the Last Year: 1     Unstable Housing in the Last Year: No       Family History   Problem Relation Age of Onset    Heart Disease Mother     Cancer Father         prostate    Other Brother         macular degeneration       Review Of Systems:   CONSTITUTIONAL: Generalized body weakness.  No fever or chills.  HEENT: No nosebleed.  No double vision.  No stridor.  No hoarseness of the voice.  No hearing impairment.  RESPIRATORY: No shortness of breath.  No cough.  CARDIOVASCULAR: No chest pain.  No palpitation.  GASTROINTESTINAL: No abdominal pain.  No nausea.  No vomiting.  GENITOURINARY: No dysuria or frequency.  No bladder or kidney tumor.  HEMATOLOGIC/LYMPHATIC: No bleeding disorder.  No adenopathy.  ENDOCRINE: No polyuria or polydipsia.  MUSCULOSKELETAL: Arthritis pain.  NEUROLOGICAL: No history of seizure or stroke.   syncope.  BEHAVIOR/PSYCH: No depression or suicidal ideation.    Physical Exam:    General Appearance:  Alert, cooperative, no distress, appears stated age  Head:  Normocephalic, without obvious abnormality, atraumatic  HEENT: Fairly unremarkable findings.    Neck: Supple, no JVD  Lungs: Mildly diminished breath sounds in the bases, no wheezing  Chest Wall: No tenderness or deformity  Heart:  Regular rate and rhythm, S1, S2 normal, 1/6 systolic murmur heard best at the left sternal border and aortic area, no rubs.  Abdomen:  Soft, non-tender.  Extremities:  no cyanosis or edema  Skin: Skin color, texture, turgor normal, no rashes or lesions  Neurologic: No focal deficits.      Vitals:    06/21/24 1631 06/21/24 1700 06/21/24 1945 06/22/24 0830   BP: (!) 144/91 109/85 136/78 (!) 156/75   Pulse: 80 68 64 55   Resp: 16  16    Temp: 97.6 °F (36.4 °C)  97.5 °F (36.4 °C) 98 °F (36.7 °C)   TempSrc: Oral  Axillary Oral   SpO2: 97%  96% 99%   Weight:       Height:           EKG: Sinus rhythm with normal heart rate and left axis deviation with

## 2024-06-23 LAB
EKG ATRIAL RATE: 69 BPM
EKG P AXIS: 41 DEGREES
EKG P-R INTERVAL: 192 MS
EKG Q-T INTERVAL: 406 MS
EKG QRS DURATION: 88 MS
EKG QTC CALCULATION (BAZETT): 435 MS
EKG R AXIS: -41 DEGREES
EKG T AXIS: 66 DEGREES
EKG VENTRICULAR RATE: 69 BPM

## 2024-06-23 NOTE — DISCHARGE SUMMARY
[address] [address]                            DISCHARGE SUMMARY      PATIENT NAME: RICHARD NGUYEN             : 1940  MED REC NO: [id number]                        ROOM: 0430  ACCOUNT NO: [id number]                       ADMIT DATE: 2024  PROVIDER: Bijan Amaya DO      ADMITTING DIAGNOSIS:  Syncopal episode, probably vasovagal.    FINAL DISCHARGE DIAGNOSES:  Acute renal insufficiency with GFR of 3A, probably secondary to dehydration.   Essential hypertension.  Nonspecific urinary tract infection.  History of valvular heart disease consisting of normal ejection fraction of 65% to 70%, mild-to-moderate mitral regurgitation, mild tricuspid regurgitation and mild finding of aortic stenosis.  History of nonsustained ventricular tachycardia.  EKG showed normal sinus rhythm.  Factor V Leiden mutation, currently off anticoagulant therapy.  History of rectal prolapse with tenesmus with use of digital disimpaction to facilitate bowel movement.  Vitamin D deficiency.    COMPLICATIONS:  None.    OPERATION:  None.    CONSULTATIONS OBTAINED:  With Dr. Vu.  No OMT was given.    ADDITIONAL ADMITTING PHYSICIAN:  Dr. Rios.    CHIEF COMPLAINT AND HISTORY OF CHIEF COMPLAINT:  This is a pleasant 84-year-old white female who is admitted to [facility].  The patient presented to the hospital after apparent syncopal episode on 2024.  The patient was sitting on the toilet attempting to have a bowel movement when she passed out.  The patient did hit her head on the floor.  The patient at this time presented to the hospital, seen and evaluated, admitted to the hospital here with diagnosis of syncopal episode.  The patient had been admitted under the service Dr. Amaya and Dr. Rios.    PAST MEDICAL HISTORY:  Positive for usual childhood diseases.  Does have a history of anesthetic complication; arthritis; history of

## 2024-06-27 NOTE — PROGRESS NOTES
Physician Progress Note      PATIENT:               RICHARD NGUYEN  [facility] #:                  [id number]  :                       1940  ADMIT DATE:       2024 10:40 PM  DISCH DATE:        2024 12:30 PM  RESPONDING  PROVIDER #:        Bijan Thompson DO          QUERY TEXT:    Patient admitted with Syncope, noted to have creatinine 1.3. If possible,   please document in progress notes and discharge summary if you are evaluating   and/or treating any of the following:    The medical record reflects the following:  Risk Factors: Hypertension, Dehydration  Clinical Indicators: H&P documented Acute renal insufficiency with GFR   currently 3A, probably secondary to dehydration.  Cardiology consult note on 2024 documented Patient was apparently   dehydrated on admission with BUN elevated at 34 and creatinine of 1.3 and that   can be another reason causing her syncope  creatinine -1.3, 1.0, 0.8  GFR-39, 53, 68  BUN-34, 20  Treatment: IV Fluids, Serial Lab    Thank You,  Jessi Waterman [facility], CDS  Options provided:  -- LIBIA  -- Acute renal insufficiency  -- Other - I will add my own diagnosis  -- Disagree - Not applicable / Not valid  -- Disagree - Clinically unable to determine / Unknown  -- Refer to Clinical Documentation Reviewer    PROVIDER RESPONSE TEXT:    This patient has Acute Renal insufficiency    Query created by: Jessi Waterman on 2024 10:25 AM      QUERY TEXT:    Patient admitted with Syncope. noted to have Dehydration, UTI. If possible,   please document in progress notes and discharge summary after study the   etiology of the syncope:      The medical record reflects the following:    Risk Factors: UTI, Dehydration, Hypertension    Clinical Indicators: H&P documented Nonspecific urinary tract infection.   Cardiology consult note on  states Patient was apparently dehydrated on   admission with BUN elevated at 34 and creatinine of 1.3 and that can be   another reason

## 2024-07-15 ENCOUNTER — APPOINTMENT (OUTPATIENT)
Dept: CT IMAGING | Age: 84
End: 2024-07-15
Payer: MEDICARE

## 2024-07-15 ENCOUNTER — HOSPITAL ENCOUNTER (EMERGENCY)
Age: 84
Discharge: HOME | End: 2024-07-15
Payer: MEDICARE

## 2024-07-15 ENCOUNTER — APPOINTMENT (OUTPATIENT)
Dept: GENERAL RADIOLOGY | Age: 84
End: 2024-07-15
Payer: MEDICARE

## 2024-07-15 VITALS
OXYGEN SATURATION: 98 % | DIASTOLIC BLOOD PRESSURE: 75 MMHG | HEIGHT: 63 IN | RESPIRATION RATE: 20 BRPM | HEART RATE: 86 BPM | SYSTOLIC BLOOD PRESSURE: 123 MMHG | TEMPERATURE: 98 F | BODY MASS INDEX: 21.26 KG/M2 | WEIGHT: 120 LBS

## 2024-07-15 DIAGNOSIS — S46.912A STRAIN OF LEFT SHOULDER, INITIAL ENCOUNTER: Primary | ICD-10-CM

## 2024-07-15 PROCEDURE — 99284 EMERGENCY DEPT VISIT MOD MDM: CPT

## 2024-07-15 PROCEDURE — 72125 CT NECK SPINE W/O DYE: CPT

## 2024-07-15 PROCEDURE — 90471 IMMUNIZATION ADMIN: CPT | Performed by: PHYSICIAN ASSISTANT

## 2024-07-15 PROCEDURE — 90714 TD VACC NO PRESV 7 YRS+ IM: CPT | Performed by: PHYSICIAN ASSISTANT

## 2024-07-15 PROCEDURE — 70450 CT HEAD/BRAIN W/O DYE: CPT

## 2024-07-15 PROCEDURE — 73030 X-RAY EXAM OF SHOULDER: CPT

## 2024-07-15 PROCEDURE — 6360000002 HC RX W HCPCS: Performed by: PHYSICIAN ASSISTANT

## 2024-07-15 RX ADMIN — CLOSTRIDIUM TETANI TOXOID ANTIGEN (FORMALDEHYDE INACTIVATED) AND CORYNEBACTERIUM DIPHTHERIAE TOXOID ANTIGEN (FORMALDEHYDE INACTIVATED) 0.5 ML: 5; 2 INJECTION, SUSPENSION INTRAMUSCULAR at 15:26

## 2024-07-15 ASSESSMENT — PAIN SCALES - GENERAL: PAINLEVEL_OUTOF10: 6

## 2024-07-15 ASSESSMENT — LIFESTYLE VARIABLES
HOW OFTEN DO YOU HAVE A DRINK CONTAINING ALCOHOL: NEVER
HOW MANY STANDARD DRINKS CONTAINING ALCOHOL DO YOU HAVE ON A TYPICAL DAY: PATIENT DOES NOT DRINK

## 2024-07-15 ASSESSMENT — PAIN - FUNCTIONAL ASSESSMENT: PAIN_FUNCTIONAL_ASSESSMENT: 0-10

## 2024-07-15 NOTE — ED PROVIDER NOTES
Independent REGINALDO Visit.      HPI:  7/15/24, Time: 2:02 PM EDT         Felicia Smith is a 84 y.o. female presenting to the ED for fall, beginning prior to arrival .  The complaint has been persistent, moderate in severity, and worsened by movement of left shoulder .   Patient tripped and fell injuring her left shoulder with limited range of motion of the shoulder present.  She denied any head injury no loss of consciousness no neck pain.  No blood thinners.  Patient took Tylenol around 11 AM      Review of Systems:   A complete review of systems was performed and pertinent positives and negatives are stated within HPI, all other systems reviewed and are negative.          --------------------------------------------- PAST HISTORY ---------------------------------------------  Past Medical History:  has a past medical history of Anesthesia, Anesthesia complication, Arthritis, Cancer (HCC), COPD (chronic obstructive pulmonary disease) (HCC), GERD (gastroesophageal reflux disease), H/O factor V Leiden mutation, Hiatal hernia, Hx of blood clots, Hx of mammogram, Hyperlipidemia, Hypertension, Phlebitis, and Pulmonary embolism (HCC).    Past Surgical History:  has a past surgical history that includes Hysterectomy; Vein Surgery; Hemorrhoid surgery; Upper gastrointestinal endoscopy; Rectal prolapse repair; Endoscopy, colon, diagnostic; vascular surgery; Breast surgery; Rotator cuff repair; Appendectomy; Colonoscopy (2012); Colonoscopy (10/9/13); Colonoscopy (2/19/15); Abdomen surgery; other surgical history (10/14/2016); Upper gastrointestinal endoscopy (02/01/2017); hernia repair; eye surgery; pr colonoscopy flx dx w/collj spec when pfrmd (N/A, 4/18/2018); Upper gastrointestinal endoscopy (N/A, 4/18/2018); Upper gastrointestinal endoscopy (N/A, 6/13/2019); Upper gastrointestinal endoscopy (N/A, 12/23/2020); and Colonoscopy (N/A, 12/23/2020).    Social History:  reports that she has been smoking cigarettes. She has a 25.0

## 2024-07-24 ENCOUNTER — OFFICE VISIT (OUTPATIENT)
Dept: ORTHOPEDIC SURGERY | Age: 84
End: 2024-07-24
Payer: MEDICARE

## 2024-07-24 VITALS — WEIGHT: 120 LBS | TEMPERATURE: 98 F | HEIGHT: 63 IN | BODY MASS INDEX: 21.26 KG/M2

## 2024-07-24 DIAGNOSIS — S46.012A TRAUMATIC COMPLETE TEAR OF LEFT ROTATOR CUFF, INITIAL ENCOUNTER: Primary | ICD-10-CM

## 2024-07-24 PROCEDURE — 99203 OFFICE O/P NEW LOW 30 MIN: CPT | Performed by: ORTHOPAEDIC SURGERY

## 2024-07-24 PROCEDURE — 1123F ACP DISCUSS/DSCN MKR DOCD: CPT | Performed by: ORTHOPAEDIC SURGERY

## 2024-07-24 ASSESSMENT — ENCOUNTER SYMPTOMS
EYE DISCHARGE: 0
ALLERGIC/IMMUNOLOGIC NEGATIVE: 1
SHORTNESS OF BREATH: 0
ABDOMINAL DISTENTION: 0

## 2024-07-24 NOTE — PROGRESS NOTES
interpreted -   Left shoulder 2 views - mild DJD, no fracture or dislocation    An electronic signature was used to authenticate this note.    --Mateusz Smith MD

## 2024-08-12 ENCOUNTER — HOSPITAL ENCOUNTER (EMERGENCY)
Age: 84
Discharge: HOME | End: 2024-08-13
Attending: EMERGENCY MEDICINE
Payer: MEDICARE

## 2024-08-12 DIAGNOSIS — S70.01XA CONTUSION OF RIGHT HIP, INITIAL ENCOUNTER: Primary | ICD-10-CM

## 2024-08-12 PROCEDURE — 6370000000 HC RX 637 (ALT 250 FOR IP): Performed by: EMERGENCY MEDICINE

## 2024-08-12 PROCEDURE — 99283 EMERGENCY DEPT VISIT LOW MDM: CPT

## 2024-08-12 RX ORDER — OXYCODONE HYDROCHLORIDE AND ACETAMINOPHEN 5; 325 MG/1; MG/1
1 TABLET ORAL ONCE
Status: COMPLETED | OUTPATIENT
Start: 2024-08-12 | End: 2024-08-12

## 2024-08-12 RX ADMIN — OXYCODONE AND ACETAMINOPHEN 1 TABLET: 5; 325 TABLET ORAL at 22:57

## 2024-08-12 ASSESSMENT — PAIN DESCRIPTION - DESCRIPTORS
DESCRIPTORS: ACHING;DULL
DESCRIPTORS: ACHING

## 2024-08-12 ASSESSMENT — PAIN DESCRIPTION - LOCATION: LOCATION: HIP

## 2024-08-12 ASSESSMENT — PAIN SCALES - GENERAL: PAINLEVEL_OUTOF10: 7

## 2024-08-12 ASSESSMENT — PAIN DESCRIPTION - ORIENTATION: ORIENTATION: RIGHT

## 2024-08-12 ASSESSMENT — PAIN - FUNCTIONAL ASSESSMENT: PAIN_FUNCTIONAL_ASSESSMENT: 0-10

## 2024-08-13 ENCOUNTER — APPOINTMENT (OUTPATIENT)
Dept: GENERAL RADIOLOGY | Age: 84
End: 2024-08-13
Payer: MEDICARE

## 2024-08-13 VITALS
HEART RATE: 83 BPM | TEMPERATURE: 97.6 F | RESPIRATION RATE: 23 BRPM | BODY MASS INDEX: 21.26 KG/M2 | SYSTOLIC BLOOD PRESSURE: 182 MMHG | OXYGEN SATURATION: 95 % | DIASTOLIC BLOOD PRESSURE: 91 MMHG | WEIGHT: 120 LBS

## 2024-08-13 PROCEDURE — 73502 X-RAY EXAM HIP UNI 2-3 VIEWS: CPT

## 2024-08-13 NOTE — ED PROVIDER NOTES
[facility] EMERGENCY DEPARTMENT  EMERGENCY DEPARTMENT ENCOUNTER        Pt Name: Felicia Smith  MRN: [id number]  Birthdate 1940  Date of evaluation: 8/12/2024  Provider: Eric Pace DO  PCP: Mike Nunez DO  Note Started: 10:46 PM EDT 8/12/24    CHIEF COMPLAINT       Chief Complaint   Patient presents with    Fall     Fell, right leg pain       HISTORY OF PRESENT ILLNESS: 1 or more Elements   History From: patient    Limitations to history : None    Felicia Smith is a 84 y.o. female who presents to the ED for evaluation of right hip pain.  Patient states around 530 this evening she had fallen while in the garden.  She landed on the right hip.  She was able to get up and walk on it but states that since then the pain has been gradually worsening.  Denies hitting her head.  Denies neck or back pain.  Pain is worse with any movement.  Has not taken anything at home.  Arrived by EMS.  Did not receive anything for pain control by EMS.    Nursing Notes were all reviewed and agreed with or any disagreements were addressed in the HPI.      REVIEW OF EXTERNAL NOTE :        REVIEW OF SYSTEMS :           Positives and Pertinent negatives as per HPI.     SURGICAL HISTORY     Past Surgical History:   Procedure Laterality Date    ABDOMEN SURGERY      colon resection    APPENDECTOMY      BREAST SURGERY      biopsies on both breasts     COLONOSCOPY  2012    Dr Vasquez    COLONOSCOPY  10/9/13    COLONOSCOPY  2/19/15    2 polyps ascending colon    COLONOSCOPY N/A 12/23/2020    COLONOSCOPY POLYPECTOMY HOT BIOPSY performed by Praveen Vasquez MD at [facility] ENDOSCOPY    ENDOSCOPY, COLON, DIAGNOSTIC      EYE SURGERY      cataracts both eyes    HEMORRHOID SURGERY      HERNIA REPAIR      HYSTERECTOMY (CERVIX STATUS UNKNOWN)      OTHER SURGICAL HISTORY  10/14/2016    laparoscopic paraesophageal hernia repair with fundiplication    GA COLONOSCOPY FLX DX W/COLLJ SPEC WHEN PFRMD N/A 4/18/2018

## 2024-08-20 ENCOUNTER — OFFICE VISIT (OUTPATIENT)
Dept: ORTHOPEDIC SURGERY | Age: 84
End: 2024-08-20
Payer: MEDICARE

## 2024-08-20 VITALS — WEIGHT: 120 LBS | HEIGHT: 63 IN | BODY MASS INDEX: 21.26 KG/M2

## 2024-08-20 DIAGNOSIS — S46.012A TRAUMATIC COMPLETE TEAR OF LEFT ROTATOR CUFF, INITIAL ENCOUNTER: ICD-10-CM

## 2024-08-20 DIAGNOSIS — S72.141A CLOSED INTERTROCHANTERIC FRACTURE OF HIP, RIGHT, INITIAL ENCOUNTER (HCC): Primary | ICD-10-CM

## 2024-08-20 DIAGNOSIS — M25.551 RIGHT HIP PAIN: ICD-10-CM

## 2024-08-20 DIAGNOSIS — S42.295A OTHER CLOSED NONDISPLACED FRACTURE OF PROXIMAL END OF LEFT HUMERUS, INITIAL ENCOUNTER: ICD-10-CM

## 2024-08-20 PROCEDURE — 1123F ACP DISCUSS/DSCN MKR DOCD: CPT | Performed by: ORTHOPAEDIC SURGERY

## 2024-08-20 PROCEDURE — 99214 OFFICE O/P EST MOD 30 MIN: CPT | Performed by: ORTHOPAEDIC SURGERY

## 2024-08-20 ASSESSMENT — ENCOUNTER SYMPTOMS
ABDOMINAL DISTENTION: 0
ALLERGIC/IMMUNOLOGIC NEGATIVE: 1
SHORTNESS OF BREATH: 0
EYE DISCHARGE: 0

## 2024-08-20 NOTE — PROGRESS NOTES
Felicia Smith (:  1940) is a 84 y.o. female,Established patient, here for evaluation of the following chief complaint(s):  Shoulder Pain (Left shoulder MRI f/u. Patient states she is doing ok. Full ROM. Denies pain. Can just tell its not right. Right hand dominant. )      Assessment & Plan   ASSESSMENT/PLAN:  1. Closed intertrochanteric fracture of hip, right, initial encounter (HCC)  -     MRI HIP RIGHT WO CONTRAST; Future  2. Traumatic complete tear of left rotator cuff, initial encounter  3. Other closed nondisplaced fracture of proximal end of left humerus, initial encounter  4. Right hip pain  -     MRI HIP RIGHT WO CONTRAST; Future      This is a 84 y.o. year old female with Closed intertrochanteric fracture of hip, right, initial encounter ([facility]) [S72.141A] which is not seen on xray but exam and history make this concerning.  MRI of L shoulder demonstrated supraspinatus tear and nondisplaced proximal humerus fracture    Due to occult nature of R hip fracture we need a stat MRI to further evaluate this.      In regards to left shoulder - will continue to monitor symptoms as they have already greatly improved.    Return for MRI review.         Subjective   SUBJECTIVE/OBJECTIVE:  HPI    84-year-old female here today to discuss her left shoulder.  She states she had a fall approximately 4 weeks ago.  She is unsure how she fell.  She noted immediate pain and difficulty with range of motion of her left shoulder.  She essentially has no use of her left shoulder.  She did have x-rays which did not demonstrate any fractures or dislocations but MRI was obtained which showed a nondisplaced prox humerus fracture and supraspinatus tear.  ROM and strength has improved.  She is weaned herself out of the sling.  She recently had another fall where she landed on her right hip.  Since that time she has required the use of a walker and is having difficulty bearing weight on the hip.  She did have x-rays which I will

## 2024-08-21 ENCOUNTER — TELEPHONE (OUTPATIENT)
Dept: ORTHOPEDIC SURGERY | Age: 84
End: 2024-08-21

## 2024-08-21 DIAGNOSIS — S72.141A CLOSED INTERTROCHANTERIC FRACTURE OF HIP, RIGHT, INITIAL ENCOUNTER (HCC): ICD-10-CM

## 2024-08-21 DIAGNOSIS — M25.551 RIGHT HIP PAIN: ICD-10-CM

## 2024-08-21 NOTE — TELEPHONE ENCOUNTER
Stat Right Hip MRI. Spoke with [facility], they have an opening at 6:30 pm 8/22/24. Transferred to scheduling and spoke with Smita. Patient scheduled.   Patient notified of 6:30 MRI with 6 pm arrival time. Needs photo ID and INS card. Patient thanked me for prompt care.

## 2024-08-22 ENCOUNTER — HOSPITAL ENCOUNTER (OUTPATIENT)
Dept: MRI IMAGING | Age: 84
Discharge: HOME | End: 2024-08-24
Attending: ORTHOPAEDIC SURGERY
Payer: MEDICARE

## 2024-08-22 PROCEDURE — 73721 MRI JNT OF LWR EXTRE W/O DYE: CPT

## 2024-08-23 ENCOUNTER — HOSPITAL ENCOUNTER (INPATIENT)
Age: 84
LOS: 4 days | Discharge: HOME HEALTH | DRG: 481 | End: 2024-08-27
Attending: ORTHOPAEDIC SURGERY | Admitting: ORTHOPAEDIC SURGERY
Payer: MEDICARE

## 2024-08-23 DIAGNOSIS — S72.001A FRACTURED HIP, RIGHT, CLOSED, INITIAL ENCOUNTER (HCC): Primary | ICD-10-CM

## 2024-08-23 PROBLEM — S72.90XA FEMUR FRACTURE (HCC): Status: ACTIVE | Noted: 2024-08-23

## 2024-08-23 PROCEDURE — 6370000000 HC RX 637 (ALT 250 FOR IP)

## 2024-08-23 PROCEDURE — 6360000002 HC RX W HCPCS

## 2024-08-23 PROCEDURE — 1200000000 HC SEMI PRIVATE

## 2024-08-23 RX ORDER — MULTIVITAMIN WITH FOLIC ACID 400 MCG
1 TABLET ORAL DAILY
Status: DISCONTINUED | OUTPATIENT
Start: 2024-08-24 | End: 2024-08-23 | Stop reason: CLARIF

## 2024-08-23 RX ORDER — CALCIUM POLYCARBOPHIL 625 MG 625 MG/1
625 TABLET ORAL DAILY
Status: DISCONTINUED | OUTPATIENT
Start: 2024-08-24 | End: 2024-08-27 | Stop reason: HOSPADM

## 2024-08-23 RX ORDER — ACETAMINOPHEN 325 MG/1
650 TABLET ORAL EVERY 6 HOURS PRN
Status: DISCONTINUED | OUTPATIENT
Start: 2024-08-23 | End: 2024-08-27 | Stop reason: HOSPADM

## 2024-08-23 RX ORDER — VITAMIN B COMPLEX
2000 TABLET ORAL DAILY
Status: DISCONTINUED | OUTPATIENT
Start: 2024-08-23 | End: 2024-08-27 | Stop reason: HOSPADM

## 2024-08-23 RX ORDER — ENOXAPARIN SODIUM 100 MG/ML
40 INJECTION SUBCUTANEOUS DAILY
Status: DISCONTINUED | OUTPATIENT
Start: 2024-08-23 | End: 2024-08-27 | Stop reason: HOSPADM

## 2024-08-23 RX ORDER — LISINOPRIL 10 MG/1
10 TABLET ORAL DAILY
Status: DISCONTINUED | OUTPATIENT
Start: 2024-08-24 | End: 2024-08-27 | Stop reason: HOSPADM

## 2024-08-23 RX ORDER — CALCIUM POLYCARBOPHIL 625 MG 625 MG/1
625 TABLET ORAL DAILY
COMMUNITY

## 2024-08-23 RX ORDER — PANTOPRAZOLE SODIUM 40 MG/1
40 TABLET, DELAYED RELEASE ORAL
Status: DISCONTINUED | OUTPATIENT
Start: 2024-08-24 | End: 2024-08-25

## 2024-08-23 RX ORDER — AMLODIPINE BESYLATE 5 MG/1
5 TABLET ORAL DAILY
Status: DISCONTINUED | OUTPATIENT
Start: 2024-08-24 | End: 2024-08-27 | Stop reason: HOSPADM

## 2024-08-23 RX ORDER — M-VIT,TX,IRON,MINS/CALC/FOLIC 27MG-0.4MG
1 TABLET ORAL DAILY
Status: DISCONTINUED | OUTPATIENT
Start: 2024-08-24 | End: 2024-08-27 | Stop reason: HOSPADM

## 2024-08-23 RX ORDER — OXYCODONE HYDROCHLORIDE 5 MG/1
5 TABLET ORAL EVERY 4 HOURS PRN
Status: DISCONTINUED | OUTPATIENT
Start: 2024-08-23 | End: 2024-08-27 | Stop reason: HOSPADM

## 2024-08-23 RX ORDER — ROPINIROLE 0.5 MG/1
0.25 TABLET, FILM COATED ORAL NIGHTLY
Status: DISCONTINUED | OUTPATIENT
Start: 2024-08-23 | End: 2024-08-27 | Stop reason: HOSPADM

## 2024-08-23 RX ADMIN — ENOXAPARIN SODIUM 40 MG: 100 INJECTION SUBCUTANEOUS at 19:27

## 2024-08-23 RX ADMIN — ROPINIROLE HYDROCHLORIDE 0.25 MG: 0.5 TABLET, FILM COATED ORAL at 21:11

## 2024-08-23 RX ADMIN — OXYCODONE HYDROCHLORIDE 5 MG: 5 TABLET ORAL at 19:27

## 2024-08-23 ASSESSMENT — PAIN SCALES - GENERAL
PAINLEVEL_OUTOF10: 7
PAINLEVEL_OUTOF10: 1

## 2024-08-23 ASSESSMENT — PAIN DESCRIPTION - ORIENTATION: ORIENTATION: RIGHT

## 2024-08-23 ASSESSMENT — PAIN DESCRIPTION - DESCRIPTORS: DESCRIPTORS: DISCOMFORT;SORE;ACHING

## 2024-08-23 ASSESSMENT — PAIN DESCRIPTION - LOCATION: LOCATION: HIP

## 2024-08-23 NOTE — CONSULTS
Dictate [id number]    Felicia was counseled on smoking cessation. Felicia smokes approximately 3 cigarettes per day x 30 years. We have had extensive discussion regarding the need to quit smoking, the negative health implications of smoking overall, as well as the impact on Felicia's comorbid conditions. Felicia does  voice a willingness to quit smoking and we have  set a quit date.  We have discussed potential methods for smoking cessation including nicotine replacement via patch, gum or lozenge, behavioral and lifestyle modifications, and follow-up with primary care provider for consideration of medications for smoking cessation as well.  We have also discussed additional resources such as CDC site for additional information, quitassist.com, and Quitline [place].  We have discussed the arrangement of follow-up with primary care provider to discuss progress as well as the potential institution of medications if required/desired.  The amount of time spent counseling the patient directly regarding smoking cessation is greater than 10 minutes.

## 2024-08-24 ENCOUNTER — ANESTHESIA EVENT (OUTPATIENT)
Dept: OPERATING ROOM | Age: 84
End: 2024-08-24
Payer: MEDICARE

## 2024-08-24 LAB
25(OH)D3 SERPL-MCNC: 31 NG/ML (ref 30–100)
ALBUMIN SERPL-MCNC: 3.4 G/DL (ref 3.5–5.2)
ALP SERPL-CCNC: 109 U/L (ref 35–104)
ALT SERPL-CCNC: 11 U/L (ref 0–32)
ANION GAP SERPL CALCULATED.3IONS-SCNC: 7 MMOL/L (ref 7–16)
AST SERPL-CCNC: 16 U/L (ref 0–31)
BASOPHILS # BLD: 0.05 K/UL (ref 0–0.2)
BASOPHILS NFR BLD: 1 % (ref 0–2)
BILIRUB SERPL-MCNC: 0.4 MG/DL (ref 0–1.2)
BUN SERPL-MCNC: 21 MG/DL (ref 6–23)
CALCIUM SERPL-MCNC: 9.6 MG/DL (ref 8.6–10.2)
CHLORIDE SERPL-SCNC: 101 MMOL/L (ref 98–107)
CHOLEST SERPL-MCNC: 172 MG/DL
CO2 SERPL-SCNC: 29 MMOL/L (ref 22–29)
CREAT SERPL-MCNC: 0.8 MG/DL (ref 0.5–1)
EOSINOPHIL # BLD: 0.16 K/UL (ref 0.05–0.5)
EOSINOPHILS RELATIVE PERCENT: 3 % (ref 0–6)
ERYTHROCYTE [DISTWIDTH] IN BLOOD BY AUTOMATED COUNT: 14.1 % (ref 11.5–15)
FOLATE SERPL-MCNC: 17.9 NG/ML (ref 4.8–24.2)
GFR, ESTIMATED: 74 ML/MIN/1.73M2
GLUCOSE SERPL-MCNC: 86 MG/DL (ref 74–99)
HCT VFR BLD AUTO: 41.1 % (ref 34–48)
HDLC SERPL-MCNC: 47 MG/DL
HGB BLD-MCNC: 13.2 G/DL (ref 11.5–15.5)
IMM GRANULOCYTES # BLD AUTO: <0.03 K/UL (ref 0–0.58)
IMM GRANULOCYTES NFR BLD: 0 % (ref 0–5)
IRON SATN MFR SERPL: 43 % (ref 15–50)
IRON SERPL-MCNC: 110 UG/DL (ref 37–145)
LDLC SERPL CALC-MCNC: 99 MG/DL
LYMPHOCYTES NFR BLD: 1.26 K/UL (ref 1.5–4)
LYMPHOCYTES RELATIVE PERCENT: 26 % (ref 20–42)
MCH RBC QN AUTO: 29.2 PG (ref 26–35)
MCHC RBC AUTO-ENTMCNC: 32.1 G/DL (ref 32–34.5)
MCV RBC AUTO: 90.9 FL (ref 80–99.9)
MONOCYTES NFR BLD: 0.48 K/UL (ref 0.1–0.95)
MONOCYTES NFR BLD: 10 % (ref 2–12)
NEUTROPHILS NFR BLD: 60 % (ref 43–80)
NEUTS SEG NFR BLD: 2.95 K/UL (ref 1.8–7.3)
PLATELET # BLD AUTO: 178 K/UL (ref 130–450)
PMV BLD AUTO: 10.6 FL (ref 7–12)
POTASSIUM SERPL-SCNC: 3.9 MMOL/L (ref 3.5–5)
PROT SERPL-MCNC: 5.4 G/DL (ref 6.4–8.3)
RBC # BLD AUTO: 4.52 M/UL (ref 3.5–5.5)
SODIUM SERPL-SCNC: 137 MMOL/L (ref 132–146)
T4 FREE SERPL-MCNC: 1.2 NG/DL (ref 0.9–1.7)
TIBC SERPL-MCNC: 253 UG/DL (ref 250–450)
TRIGL SERPL-MCNC: 129 MG/DL
TROPONIN I SERPL HS-MCNC: 11 NG/L (ref 0–9)
TSH SERPL DL<=0.05 MIU/L-ACNC: 1.39 UIU/ML (ref 0.27–4.2)
VIT B12 SERPL-MCNC: 341 PG/ML (ref 211–946)
VLDLC SERPL CALC-MCNC: 26 MG/DL
WBC OTHER # BLD: 4.9 K/UL (ref 4.5–11.5)

## 2024-08-24 PROCEDURE — 6370000000 HC RX 637 (ALT 250 FOR IP)

## 2024-08-24 PROCEDURE — 1200000000 HC SEMI PRIVATE

## 2024-08-24 PROCEDURE — 6360000002 HC RX W HCPCS

## 2024-08-24 PROCEDURE — 84439 ASSAY OF FREE THYROXINE: CPT

## 2024-08-24 PROCEDURE — 80061 LIPID PANEL: CPT

## 2024-08-24 PROCEDURE — 80053 COMPREHEN METABOLIC PANEL: CPT

## 2024-08-24 PROCEDURE — 82746 ASSAY OF FOLIC ACID SERUM: CPT

## 2024-08-24 PROCEDURE — 82306 VITAMIN D 25 HYDROXY: CPT

## 2024-08-24 PROCEDURE — 82607 VITAMIN B-12: CPT

## 2024-08-24 PROCEDURE — 83550 IRON BINDING TEST: CPT

## 2024-08-24 PROCEDURE — 85025 COMPLETE CBC W/AUTO DIFF WBC: CPT

## 2024-08-24 PROCEDURE — 84484 ASSAY OF TROPONIN QUANT: CPT

## 2024-08-24 PROCEDURE — 83540 ASSAY OF IRON: CPT

## 2024-08-24 PROCEDURE — 36415 COLL VENOUS BLD VENIPUNCTURE: CPT

## 2024-08-24 PROCEDURE — 84443 ASSAY THYROID STIM HORMONE: CPT

## 2024-08-24 RX ORDER — POTASSIUM CHLORIDE 7.45 MG/ML
10 INJECTION INTRAVENOUS PRN
Status: DISCONTINUED | OUTPATIENT
Start: 2024-08-24 | End: 2024-08-27 | Stop reason: HOSPADM

## 2024-08-24 RX ORDER — POTASSIUM CHLORIDE 1500 MG/1
40 TABLET, EXTENDED RELEASE ORAL PRN
Status: DISCONTINUED | OUTPATIENT
Start: 2024-08-24 | End: 2024-08-27 | Stop reason: HOSPADM

## 2024-08-24 RX ORDER — MAGNESIUM SULFATE IN WATER 40 MG/ML
2000 INJECTION, SOLUTION INTRAVENOUS PRN
Status: DISCONTINUED | OUTPATIENT
Start: 2024-08-24 | End: 2024-08-27 | Stop reason: HOSPADM

## 2024-08-24 RX ADMIN — Medication 2000 UNITS: at 08:03

## 2024-08-24 RX ADMIN — ENOXAPARIN SODIUM 40 MG: 100 INJECTION SUBCUTANEOUS at 08:03

## 2024-08-24 RX ADMIN — OXYCODONE HYDROCHLORIDE 5 MG: 5 TABLET ORAL at 19:50

## 2024-08-24 RX ADMIN — ACETAMINOPHEN 650 MG: 325 TABLET ORAL at 03:43

## 2024-08-24 RX ADMIN — CALCIUM POLYCARBOPHIL 625 MG: 625 TABLET ORAL at 14:47

## 2024-08-24 RX ADMIN — ROPINIROLE HYDROCHLORIDE 0.25 MG: 0.5 TABLET, FILM COATED ORAL at 19:50

## 2024-08-24 RX ADMIN — AMLODIPINE BESYLATE 5 MG: 5 TABLET ORAL at 08:04

## 2024-08-24 RX ADMIN — LISINOPRIL 10 MG: 10 TABLET ORAL at 08:03

## 2024-08-24 RX ADMIN — Medication 1 TABLET: at 08:03

## 2024-08-24 RX ADMIN — OXYCODONE HYDROCHLORIDE 5 MG: 5 TABLET ORAL at 14:47

## 2024-08-24 RX ADMIN — PANTOPRAZOLE SODIUM 40 MG: 40 TABLET, DELAYED RELEASE ORAL at 06:25

## 2024-08-24 ASSESSMENT — PAIN DESCRIPTION - DESCRIPTORS
DESCRIPTORS: DULL
DESCRIPTORS: DULL
DESCRIPTORS: ACHING
DESCRIPTORS: ACHING

## 2024-08-24 ASSESSMENT — PAIN SCALES - GENERAL
PAINLEVEL_OUTOF10: 1
PAINLEVEL_OUTOF10: 4
PAINLEVEL_OUTOF10: 4
PAINLEVEL_OUTOF10: 6
PAINLEVEL_OUTOF10: 6
PAINLEVEL_OUTOF10: 1
PAINLEVEL_OUTOF10: 4

## 2024-08-24 ASSESSMENT — PAIN DESCRIPTION - ORIENTATION
ORIENTATION: RIGHT
ORIENTATION: RIGHT;LEFT

## 2024-08-24 ASSESSMENT — PAIN DESCRIPTION - LOCATION
LOCATION: LEG;KNEE
LOCATION: KNEE

## 2024-08-24 ASSESSMENT — COPD QUESTIONNAIRES: CAT_SEVERITY: MILD

## 2024-08-24 NOTE — PROGRESS NOTES
Department of Orthopedic Surgery   Progress Note    Patient seen and examined.  Pleasant this morning.  Right hip pain is controlled at rest with no activity.  Denies chest pain, shortness of breath, calf pain, dizziness/lightheadedness, fevers or chills.     VITALS:  BP (!) 142/82   Pulse 64   Temp 97.3 °F (36.3 °C) (Oral)   Resp 18   Ht 1.6 m (5' 3\")   Wt 54.4 kg (120 lb)   LMP  (LMP Unknown)   SpO2 98%   BMI 21.26 kg/m²     GENERAL: In bed, comfortable  MUSCULOSKELETAL:   right lower extremity:  Compartments soft and compressible, calf non-tender  Palpable dorsalis pedis and posterior tibialis pulse, brisk cap refill to toes, foot warm and perfused  Sensation intact to light touch in sural/deep peroneal/superficial peroneal/saphenous/posterior tibial nerve distributions to foot/ankle.  Demonstrates active ankle plantar/dorsiflexion/great toe extension    CBC:   Lab Results   Component Value Date/Time    WBC 4.9 08/24/2024 03:38 AM    HGB 13.2 08/24/2024 03:38 AM    HCT 41.1 08/24/2024 03:38 AM     08/24/2024 03:38 AM       ASSESSMENT  Right basicervical femoral neck fracture    PLAN    Nonweightbearing right lower extremity  Plan for right hip cephalomedullary nail stabilization tentatively tomorrow 8/25 for Monday 8/26 pending medical optimization  N.p.o. order at midnight placed    Electronically signed by Nelson Urban DO on 8/24/2024 at 7:27 AM

## 2024-08-24 NOTE — H&P
[address] [address]                           HISTORY & PHYSICAL      PATIENT NAME: RICHARD NGUYEN             : 1940  MED REC NO: [id number]                        ROOM: 0318  ACCOUNT NO: [id number]                       ADMIT DATE: 2024  PROVIDER: Bijan Amaya DO      REFERRING PHYSICIAN:  JAMES SMITH    CHIEF COMPLAINT AND HISTORY OF CHIEF COMPLAINT:  This is a pleasant 84-year-old white female who was admitted to [facility].  The patient was transferred here under the service of Dr. Smith.   The patient states she apparently has been doing relatively well.  The patient states approximately a week or 2 ago she was out helping a friend work in the yard.  The patient at that time apparently was doing some work in the yard.  She did fall, sustaining pain in her right hip area.  The patient has been unable to ambulate since that period of time.  MRI and radiographic finding at that time did show a nondisplaced basicervical fracture of the right femoral neck.  The patient was seen by Dr. Smith at which time she was admitted to the hospital here to the general surgical floor for surgical intervention.  The patient was admitted under his service and consultation was obtained with myself.  The patient was seen at this time.  Exam at this time revealed a pleasant 84-year-old white female whom we had seen in the past this year.  The patient was hospitalized here from  to 2024.  At that time, the patient was admitted here with a diagnosis of syncopal episode, which appeared to be vasovagal in nature.  The patient was seen, evaluated, and workup at that time included an echocardiogram that did show ejection fraction of 65% to 70%, with mild-to-moderate mitral regurgitation, mild tricuspid regurgitation, along with mild finding aortic stenosis.  The patient did have a history of   We will proceed with further workup at this time, and the patient appears to be relatively stable. The patient has been admitted under the service of Dr. Smith and consultation has been obtained with Dr. Guerrier and Dr. Rios.  Currently, the patient is resting comfortably.  The patient was admitted to this institution in June where workup was performed.  I do feel that she would be stable for surgical intervention at this time, but we will obtain consultation with Dr. Vu who has seen her in the past.  We will continue on current medication.  The patient has requested to maintain a clear liquid diet because of her rectal tenesmus problem, in anticipation of  surgery. Because a history of factor V Leiden deficiency, the patient must be observed closely for any sign of developing blood clot.  The patient will be placed on Lovenox at this time until surgery is going to be performed.  Otherwise, we will continue on her previous medication at this time and analgesia will be provided with the use of incentive spirometry, along with further recommendation accordingly.  We will follow along with Dr. Smith at this time during this hospital stay and anticipate surgical intervention within the next 24 to 36 hours.  Pending results of further evaluation, we will dictate further care and treatment.  We have informed her of the behavior modification such as cigarette cessation.          ANISHA GUERRIER DO      D:  08/23/2024 18:40:57     T:  08/23/2024 23:02:16     DREW/KARTIK  Job #:  [id number]     Doc#:  [id number]

## 2024-08-24 NOTE — CONSULTS
Daily    therapeutic multivitamin-minerals  1 tablet Oral Daily     Continuous Infusions:  PRN Meds:sodium phosphate 8.7 mmol in sodium chloride 0.9 % 250 mL IVPB **OR** sodium phosphate 17.4 mmol in sodium chloride 0.9 % 250 mL IVPB, magnesium sulfate, potassium chloride **OR** potassium alternative oral replacement **OR** potassium chloride, oxyCODONE, acetaminophen    Allergies   Allergen Reactions    Sulfa Antibiotics Rash    Acetaminophen-Codeine Itching    Acetaminophen-Codeine Itching    Hydrocodone Nausea Only       Social History     Socioeconomic History    Marital status:      Spouse name: Not on file    Number of children: Not on file    Years of education: Not on file    Highest education level: Not on file   Occupational History    Not on file   Tobacco Use    Smoking status: Every Day     Current packs/day: 0.50     Average packs/day: 0.5 packs/day for 50.0 years (25.0 ttl pk-yrs)     Types: Cigarettes     Passive exposure: Current    Smokeless tobacco: Never    Tobacco comments:     1 pack per week   Vaping Use    Vaping status: Never Used   Substance and Sexual Activity    Alcohol use: No    Drug use: No    Sexual activity: Not on file   Other Topics Concern    Not on file   Social History Narrative    Not on file     Social Determinants of Health     Financial Resource Strain: Not on file   Food Insecurity: No Food Insecurity (8/23/2024)    Hunger Vital Sign     Worried About Running Out of Food in the Last Year: Never true     Ran Out of Food in the Last Year: Never true   Transportation Needs: No Transportation Needs (8/23/2024)    PRAPARE - Transportation     Lack of Transportation (Medical): No     Lack of Transportation (Non-Medical): No   Physical Activity: Not on file   Stress: Not on file   Social Connections: Not on file   Intimate Partner Violence: Not on file   Housing Stability: Low Risk  (8/23/2024)    Housing Stability Vital Sign     Unable to Pay for Housing in the Last Year:  Date/Time     08/24/2024 03:38 AM    K 3.9 08/24/2024 03:38 AM    K 5.0 09/23/2021 05:34 AM     08/24/2024 03:38 AM    CO2 29 08/24/2024 03:38 AM    BUN 21 08/24/2024 03:38 AM     CBC:    Lab Results   Component Value Date/Time    WBC 4.9 08/24/2024 03:38 AM    RBC 4.52 08/24/2024 03:38 AM    HGB 13.2 08/24/2024 03:38 AM    HCT 41.1 08/24/2024 03:38 AM    MCV 90.9 08/24/2024 03:38 AM    RDW 14.1 08/24/2024 03:38 AM     08/24/2024 03:38 AM     PT/INR:  No results found for: \"PTINR\"  TROPONIN:  No components found for: \"TROP\"        Assessment/Plan:    1. Status post mechanical fall with MRI showing a nondisplaced basicervical fracture of the right femoral neck.    2. History of chronic kidney disease stage IIIA.    3. Essential hypertension.  4.  Aortic stenosis    Patient is awaiting surgery for right hip fracture.    From the cardiac standpoint she has problem with aortic stenosis.  She had echocardiogram done 2 months ago showing moderate aortic stenosis with normal left ventricular systolic function.    She has no complaints of chest pain.    I feel patient may undergo her surgery in view of all above.  I do not feel delaying her surgery for coronary workup is for her best interest.    Recommend repeating EKG after surgery.    Electronically signed by Eric Vu MD  on 8/24/24 at 8:44 AM EDT

## 2024-08-24 NOTE — ANESTHESIA PRE PROCEDURE
emergent       Induction: intravenous.    MIPS: Postoperative opioids intended and Prophylactic antiemetics administered.  Anesthetic plan and risks discussed with patient.      Plan discussed with CRNA.                  Pt seen and examined, chart reviewed (including anesthesia, drug and allergy history).  No interval changes to history and physical examination. (except as noted above).      Anesthetic plan, risks, benefits, alternatives, and personnel involved discussed with patient.  Patient verbalized an understanding and agrees to proceed.  Plan discussed with care team members and agreed upon.      Andrea Poole MD   8/25/2024

## 2024-08-24 NOTE — H&P
Felicia Smith (:  1940) is a 84 y.o. female, being admitted for a right intertrochanteric femur fracture      Assessment & Plan   ASSESSMENT/PLAN:  Right nondisplaced intertrochanteric femur fracture  Plan for optimization and surgical intervention  or Monday pending clearances  Recommend places minimal weight on leg prior to surgery to help avoid any displacement    The procedure of ORIF R hip fracture was discussed with the patient today. Preoperative and postoperative care was discussed in detail. Alternative nonsurgical treatment options were again reviewed. After discussing all options the patient wishes to proceed with surgical management. The patient voices understand of the procedure and agrees to proceed. All the patient's questions. Verbal consent was obtained.     The risks of ORIF R hip fracture were discussed in detail with the patient including but not limited to: infection (superficial and deep), DVT, PE, arthrofibrosis, persistent pain, chronic limb swelling, neurovascular injury, dislocation or subluxation, excessive bleeding, intraoperative and/or postoperative fracture, loosening/wear/failure of implants, possible need for further surgery, cardiopulmonary and medical complications, amputation and death. The patient voices understand of the potential complications and wishes to proceed. The benefits and alternatives have been discussed.           Subjective   SUBJECTIVE/OBJECTIVE:  HPI    84 year old female who had a fall about 10 days ago.  She originally went to the ED and xrays were obtained.  She continued to have pain with weight bearing and was requiring use of assistive devices.  MRI was obtained which demonstrated a nondisplaced IT fracture.  She has been admitted for optimization prior to surgical intervention    Past Medical History:   Diagnosis Date    Anesthesia     with rectal repair had spinal heart stopped    Anesthesia complication     no spinals heart stopped     authenticate this note.    --Mateusz Smith MD

## 2024-08-24 NOTE — PROGRESS NOTES
neurological deficits, generalized weakness and deconditioning.  Psch:   Denies being anxious or depressed.  Musculoskeletal:    Denies  myalgias, joint complaints or back pain.   Integumentary:   Denies any rashes, ulcers, or excoriations.  Denies bruising.  Hematologic/Lymphatic:  Denies bruising or bleeding.    Physical Exam:  I/O this shift:  In: 240 [P.O.:240]  Out: 700 [Urine:700]    Intake/Output Summary (Last 24 hours) at 8/24/2024 0624  Last data filed at 8/24/2024 0037  Gross per 24 hour   Intake 240 ml   Output 700 ml   Net -460 ml   No intake/output data recorded.  Patient Vitals for the past 96 hrs (Last 3 readings):   Weight   08/23/24 1545 54.4 kg (120 lb)     Vital Signs:   Blood pressure (!) 142/82, pulse 64, temperature 97.3 °F (36.3 °C), temperature source Oral, resp. rate 18, height 1.6 m (5' 3\"), weight 54.4 kg (120 lb), SpO2 98%, not currently breastfeeding.    General appearance:  Alert, responsive, oriented to person, place, and time. Well preserved, alert, no distress.  Head:  Normocephalic. No masses, lesions or tenderness.  Eyes:  PERRLA.  EOMI.  Sclera clear.  Buccal mucosa moist.  ENT:  Ears normal. Mucosa normal.  Neck:    Supple. Trachea midline. No thyromegaly. No JVD. No bruits.  Heart:    Rhythm regular. Rate controlled.  No murmurs.  Lungs:    Symmetrical. Clear to auscultation bilaterally.  No wheezes. No rhonchi. No rales.  Abdomen:   Soft. Non-tender. Non-distended. Bowel sounds positive. No organomegaly or masses.  No pain on palpation.  Extremities:    Peripheral pulses present.  No peripheral edema.  No ulcers. No cyanosis. No clubbing.  Neurologic:    Alert x 3.  No focal deficit.  Cranial nerves grossly intact. No focal weakness.  Psych:   Behavior is normal. Mood appears normal. Speech is not rapid and/or pressured.  Musculoskeletal:   Spine ROM normal. Muscular strength intact. Gait not assessed.  Integumentary:  No rashes  Skin normal color and  including pulmonary embolism previously on Coumadin therapy with discontinuation several years back secondary to a multitude of issues  History of colon cancer  Gastroesophageal reflux disease    Plan:   I have performed an extensive chart review as we risk stratify for surgical intervention.  The cardiovascular team will provide consultation as well as she follows longitudinally with Dr. Vu.  The patient has a known history of thromboembolic disease (antiphospholipid syndrome,  ?Factor V Leiden deficiency) and had previously been on Coumadin therapy many years ago.  Secondary to intermittent falls related to syncope as well as multiple GI issues necessitating frequent endoscopy, anticoagulation therapy had been permanently discontinued.  She has undergone anesthesia multiple times for endoscopy over the past 5 years without complication.  At this point, she is granted medical clearance for procedural intervention with a somewhat higher risk profile in the setting of her previous thromboembolic disease.  We will utilize DVT prophylaxis Lovenox in the postoperative period and discuss postoperative anticoagulation with the orthopedic team.  Continue current therapy.  See orders for further plan of care.    More than 50% of my time was spent at the bedside counseling/coordinating care with the patient and/or family with face to face contact.  This time was spent reviewing notes and laboratory data as well as instructing and counseling the patient. Time I spent with the family or surrogate(s) is included only if the patient was incapable of providing the necessary information or participating in medical decisions. I also discussed the differential diagnosis and all of the proposed management plans with the patient and individuals accompanying the patient. I am readily available for any further decision-making and intervention.     Neno Rios DO, MAXIME.C.O.I.  8/24/2024  6:24 AM

## 2024-08-24 NOTE — PROGRESS NOTES
4 Eyes Skin Assessment     NAME:  Felicia Smith  YOB: 1940  MEDICAL RECORD NUMBER:  [id number]    The patient is being assessed for  Admission    I agree that at least one RN has performed a thorough Head to Toe Skin Assessment on the patient. ALL assessment sites listed below have been assessed.      Areas assessed by both nurses:    Shoulders, Back, Chest, Arms, Elbows, Hands, Sacrum. Buttock, Coccyx, Ischium, and Legs. Feet and Heels        Does the Patient have a Wound? No noted wound(s)       Malcolm Prevention initiated by RN: No  Wound Care Orders initiated by RN: No    Pressure Injury (Stage 3,4, Unstageable, DTI, NWPT, and Complex wounds) if present, place Wound referral order by RN under : No    New Ostomies, if present place, Ostomy referral order under : No     Nurse 1 eSignature: Electronically signed by Reema Don RN on 8/24/24 at 5:49 PM EDT    **SHARE this note so that the co-signing nurse can place an eSignature**    Nurse 2 eSignature: Electronically signed by Krissy Bowser RN on 8/24/24 at 5:50 PM EDT

## 2024-08-25 ENCOUNTER — APPOINTMENT (OUTPATIENT)
Dept: GENERAL RADIOLOGY | Age: 84
DRG: 481 | End: 2024-08-25
Attending: ORTHOPAEDIC SURGERY
Payer: MEDICARE

## 2024-08-25 ENCOUNTER — ANESTHESIA (OUTPATIENT)
Dept: OPERATING ROOM | Age: 84
End: 2024-08-25
Payer: MEDICARE

## 2024-08-25 LAB
ALBUMIN SERPL-MCNC: 3.4 G/DL (ref 3.5–5.2)
ALP SERPL-CCNC: 109 U/L (ref 35–104)
ALT SERPL-CCNC: 10 U/L (ref 0–32)
ANION GAP SERPL CALCULATED.3IONS-SCNC: 9 MMOL/L (ref 7–16)
AST SERPL-CCNC: 15 U/L (ref 0–31)
BASOPHILS # BLD: 0.05 K/UL (ref 0–0.2)
BASOPHILS NFR BLD: 1 % (ref 0–2)
BILIRUB SERPL-MCNC: 0.4 MG/DL (ref 0–1.2)
BUN SERPL-MCNC: 20 MG/DL (ref 6–23)
CALCIUM SERPL-MCNC: 9.9 MG/DL (ref 8.6–10.2)
CHLORIDE SERPL-SCNC: 99 MMOL/L (ref 98–107)
CO2 SERPL-SCNC: 28 MMOL/L (ref 22–29)
CREAT SERPL-MCNC: 0.8 MG/DL (ref 0.5–1)
EKG ATRIAL RATE: 62 BPM
EKG P AXIS: 58 DEGREES
EKG P-R INTERVAL: 172 MS
EKG Q-T INTERVAL: 432 MS
EKG QRS DURATION: 92 MS
EKG QTC CALCULATION (BAZETT): 438 MS
EKG R AXIS: -46 DEGREES
EKG T AXIS: 78 DEGREES
EKG VENTRICULAR RATE: 62 BPM
EOSINOPHIL # BLD: 0.17 K/UL (ref 0.05–0.5)
EOSINOPHILS RELATIVE PERCENT: 4 % (ref 0–6)
ERYTHROCYTE [DISTWIDTH] IN BLOOD BY AUTOMATED COUNT: 14.1 % (ref 11.5–15)
GFR, ESTIMATED: 69 ML/MIN/1.73M2
GLUCOSE SERPL-MCNC: 87 MG/DL (ref 74–99)
HCT VFR BLD AUTO: 41.1 % (ref 34–48)
HGB BLD-MCNC: 13.3 G/DL (ref 11.5–15.5)
IMM GRANULOCYTES # BLD AUTO: 0.04 K/UL (ref 0–0.58)
IMM GRANULOCYTES NFR BLD: 1 % (ref 0–5)
LYMPHOCYTES NFR BLD: 1.2 K/UL (ref 1.5–4)
LYMPHOCYTES RELATIVE PERCENT: 25 % (ref 20–42)
MAGNESIUM SERPL-MCNC: 2 MG/DL (ref 1.6–2.6)
MCH RBC QN AUTO: 29.7 PG (ref 26–35)
MCHC RBC AUTO-ENTMCNC: 32.4 G/DL (ref 32–34.5)
MCV RBC AUTO: 91.7 FL (ref 80–99.9)
MONOCYTES NFR BLD: 0.53 K/UL (ref 0.1–0.95)
MONOCYTES NFR BLD: 11 % (ref 2–12)
NEUTROPHILS NFR BLD: 58 % (ref 43–80)
NEUTS SEG NFR BLD: 2.77 K/UL (ref 1.8–7.3)
PHOSPHATE SERPL-MCNC: 3.4 MG/DL (ref 2.5–4.5)
PLATELET # BLD AUTO: 178 K/UL (ref 130–450)
PMV BLD AUTO: 10.8 FL (ref 7–12)
POTASSIUM SERPL-SCNC: 3.9 MMOL/L (ref 3.5–5)
PROT SERPL-MCNC: 5.5 G/DL (ref 6.4–8.3)
RBC # BLD AUTO: 4.48 M/UL (ref 3.5–5.5)
SODIUM SERPL-SCNC: 136 MMOL/L (ref 132–146)
WBC OTHER # BLD: 4.8 K/UL (ref 4.5–11.5)

## 2024-08-25 PROCEDURE — 6370000000 HC RX 637 (ALT 250 FOR IP): Performed by: INTERNAL MEDICINE

## 2024-08-25 PROCEDURE — 7100000001 HC PACU RECOVERY - ADDTL 15 MIN: Performed by: ORTHOPAEDIC SURGERY

## 2024-08-25 PROCEDURE — 3700000001 HC ADD 15 MINUTES (ANESTHESIA): Performed by: ORTHOPAEDIC SURGERY

## 2024-08-25 PROCEDURE — 3600000014 HC SURGERY LEVEL 4 ADDTL 15MIN: Performed by: ORTHOPAEDIC SURGERY

## 2024-08-25 PROCEDURE — 0QS604Z REPOSITION RIGHT UPPER FEMUR WITH INTERNAL FIXATION DEVICE, OPEN APPROACH: ICD-10-PCS | Performed by: ORTHOPAEDIC SURGERY

## 2024-08-25 PROCEDURE — 2500000003 HC RX 250 WO HCPCS: Performed by: NURSE ANESTHETIST, CERTIFIED REGISTERED

## 2024-08-25 PROCEDURE — 2580000003 HC RX 258

## 2024-08-25 PROCEDURE — 3700000000 HC ANESTHESIA ATTENDED CARE: Performed by: ORTHOPAEDIC SURGERY

## 2024-08-25 PROCEDURE — 3600000004 HC SURGERY LEVEL 4 BASE: Performed by: ORTHOPAEDIC SURGERY

## 2024-08-25 PROCEDURE — 2720000010 HC SURG SUPPLY STERILE: Performed by: ORTHOPAEDIC SURGERY

## 2024-08-25 PROCEDURE — C1713 ANCHOR/SCREW BN/BN,TIS/BN: HCPCS | Performed by: ORTHOPAEDIC SURGERY

## 2024-08-25 PROCEDURE — 93010 ELECTROCARDIOGRAM REPORT: CPT | Performed by: INTERNAL MEDICINE

## 2024-08-25 PROCEDURE — 1200000000 HC SEMI PRIVATE

## 2024-08-25 PROCEDURE — 27236 TREAT THIGH FRACTURE: CPT | Performed by: ORTHOPAEDIC SURGERY

## 2024-08-25 PROCEDURE — 2500000003 HC RX 250 WO HCPCS: Performed by: SPECIALIST/TECHNOLOGIST

## 2024-08-25 PROCEDURE — 84100 ASSAY OF PHOSPHORUS: CPT

## 2024-08-25 PROCEDURE — 83735 ASSAY OF MAGNESIUM: CPT

## 2024-08-25 PROCEDURE — 93005 ELECTROCARDIOGRAM TRACING: CPT | Performed by: SPECIALIST

## 2024-08-25 PROCEDURE — 2709999900 HC NON-CHARGEABLE SUPPLY: Performed by: ORTHOPAEDIC SURGERY

## 2024-08-25 PROCEDURE — 2580000003 HC RX 258: Performed by: SPECIALIST/TECHNOLOGIST

## 2024-08-25 PROCEDURE — 6360000002 HC RX W HCPCS

## 2024-08-25 PROCEDURE — 85025 COMPLETE CBC W/AUTO DIFF WBC: CPT

## 2024-08-25 PROCEDURE — 6360000002 HC RX W HCPCS: Performed by: ORTHOPAEDIC SURGERY

## 2024-08-25 PROCEDURE — 7100000000 HC PACU RECOVERY - FIRST 15 MIN: Performed by: ORTHOPAEDIC SURGERY

## 2024-08-25 PROCEDURE — 36415 COLL VENOUS BLD VENIPUNCTURE: CPT

## 2024-08-25 PROCEDURE — 6360000002 HC RX W HCPCS: Performed by: NURSE ANESTHETIST, CERTIFIED REGISTERED

## 2024-08-25 PROCEDURE — 2580000003 HC RX 258: Performed by: NURSE ANESTHETIST, CERTIFIED REGISTERED

## 2024-08-25 PROCEDURE — 6360000002 HC RX W HCPCS: Performed by: INTERNAL MEDICINE

## 2024-08-25 PROCEDURE — 6370000000 HC RX 637 (ALT 250 FOR IP)

## 2024-08-25 PROCEDURE — 80053 COMPREHEN METABOLIC PANEL: CPT

## 2024-08-25 PROCEDURE — 6360000002 HC RX W HCPCS: Performed by: SPECIALIST/TECHNOLOGIST

## 2024-08-25 PROCEDURE — 6360000002 HC RX W HCPCS: Performed by: ANESTHESIOLOGY

## 2024-08-25 DEVICE — TROCHANTERIC NAIL KIT, TI
Type: IMPLANTABLE DEVICE | Site: HIP | Status: FUNCTIONAL
Brand: GAMMA

## 2024-08-25 DEVICE — LOCKING SCREW, FULLY THREADED: Type: IMPLANTABLE DEVICE | Site: HIP | Status: FUNCTIONAL

## 2024-08-25 DEVICE — LAG SCREW, TI
Type: IMPLANTABLE DEVICE | Site: HIP | Status: FUNCTIONAL
Brand: GAMMA

## 2024-08-25 DEVICE — K-WIRE: Type: IMPLANTABLE DEVICE | Site: HIP | Status: FUNCTIONAL

## 2024-08-25 RX ORDER — KETOROLAC TROMETHAMINE 15 MG/ML
15 INJECTION, SOLUTION INTRAMUSCULAR; INTRAVENOUS ONCE
Status: COMPLETED | OUTPATIENT
Start: 2024-08-25 | End: 2024-08-25

## 2024-08-25 RX ORDER — ROCURONIUM BROMIDE 10 MG/ML
INJECTION, SOLUTION INTRAVENOUS PRN
Status: DISCONTINUED | OUTPATIENT
Start: 2024-08-25 | End: 2024-08-25 | Stop reason: SDUPTHER

## 2024-08-25 RX ORDER — NEOSTIGMINE METHYLSULFATE 1 MG/ML
INJECTION, SOLUTION INTRAVENOUS PRN
Status: DISCONTINUED | OUTPATIENT
Start: 2024-08-25 | End: 2024-08-25 | Stop reason: SDUPTHER

## 2024-08-25 RX ORDER — BUPIVACAINE HYDROCHLORIDE 5 MG/ML
INJECTION, SOLUTION EPIDURAL; INTRACAUDAL PRN
Status: DISCONTINUED | OUTPATIENT
Start: 2024-08-25 | End: 2024-08-25 | Stop reason: ALTCHOICE

## 2024-08-25 RX ORDER — ASPIRIN 325 MG
325 TABLET, DELAYED RELEASE (ENTERIC COATED) ORAL 2 TIMES DAILY
Qty: 56 TABLET | Refills: 0 | Status: SHIPPED | OUTPATIENT
Start: 2024-08-25 | End: 2024-08-25

## 2024-08-25 RX ORDER — HALOPERIDOL 5 MG/ML
1 INJECTION INTRAMUSCULAR
Status: DISCONTINUED | OUTPATIENT
Start: 2024-08-25 | End: 2024-08-25 | Stop reason: HOSPADM

## 2024-08-25 RX ORDER — SODIUM CHLORIDE 0.9 % (FLUSH) 0.9 %
5-40 SYRINGE (ML) INJECTION EVERY 12 HOURS SCHEDULED
Status: DISCONTINUED | OUTPATIENT
Start: 2024-08-25 | End: 2024-08-25 | Stop reason: HOSPADM

## 2024-08-25 RX ORDER — ONDANSETRON 2 MG/ML
4 INJECTION INTRAMUSCULAR; INTRAVENOUS EVERY 6 HOURS PRN
Status: DISCONTINUED | OUTPATIENT
Start: 2024-08-25 | End: 2024-08-27 | Stop reason: HOSPADM

## 2024-08-25 RX ORDER — GLYCOPYRROLATE 0.2 MG/ML
INJECTION INTRAMUSCULAR; INTRAVENOUS PRN
Status: DISCONTINUED | OUTPATIENT
Start: 2024-08-25 | End: 2024-08-25 | Stop reason: SDUPTHER

## 2024-08-25 RX ORDER — ONDANSETRON 2 MG/ML
INJECTION INTRAMUSCULAR; INTRAVENOUS PRN
Status: DISCONTINUED | OUTPATIENT
Start: 2024-08-25 | End: 2024-08-25 | Stop reason: SDUPTHER

## 2024-08-25 RX ORDER — FENTANYL CITRATE 50 UG/ML
INJECTION, SOLUTION INTRAMUSCULAR; INTRAVENOUS PRN
Status: DISCONTINUED | OUTPATIENT
Start: 2024-08-25 | End: 2024-08-25 | Stop reason: SDUPTHER

## 2024-08-25 RX ORDER — PROPOFOL 10 MG/ML
INJECTION, EMULSION INTRAVENOUS PRN
Status: DISCONTINUED | OUTPATIENT
Start: 2024-08-25 | End: 2024-08-25 | Stop reason: SDUPTHER

## 2024-08-25 RX ORDER — SODIUM CHLORIDE 9 MG/ML
INJECTION, SOLUTION INTRAVENOUS CONTINUOUS PRN
Status: DISCONTINUED | OUTPATIENT
Start: 2024-08-25 | End: 2024-08-25 | Stop reason: SDUPTHER

## 2024-08-25 RX ORDER — LABETALOL HYDROCHLORIDE 5 MG/ML
10 INJECTION, SOLUTION INTRAVENOUS
Status: DISCONTINUED | OUTPATIENT
Start: 2024-08-25 | End: 2024-08-25 | Stop reason: HOSPADM

## 2024-08-25 RX ORDER — PANTOPRAZOLE SODIUM 40 MG/1
40 TABLET, DELAYED RELEASE ORAL
Status: DISCONTINUED | OUTPATIENT
Start: 2024-08-25 | End: 2024-08-27 | Stop reason: HOSPADM

## 2024-08-25 RX ORDER — IPRATROPIUM BROMIDE AND ALBUTEROL SULFATE 2.5; .5 MG/3ML; MG/3ML
1 SOLUTION RESPIRATORY (INHALATION)
Status: DISCONTINUED | OUTPATIENT
Start: 2024-08-25 | End: 2024-08-25 | Stop reason: HOSPADM

## 2024-08-25 RX ORDER — ASPIRIN 81 MG/1
81 TABLET ORAL 2 TIMES DAILY
Status: DISCONTINUED | OUTPATIENT
Start: 2024-08-26 | End: 2024-08-27 | Stop reason: HOSPADM

## 2024-08-25 RX ORDER — DIPHENHYDRAMINE HYDROCHLORIDE 50 MG/ML
12.5 INJECTION INTRAMUSCULAR; INTRAVENOUS
Status: DISCONTINUED | OUTPATIENT
Start: 2024-08-25 | End: 2024-08-25 | Stop reason: HOSPADM

## 2024-08-25 RX ORDER — LIDOCAINE HYDROCHLORIDE 20 MG/ML
INJECTION, SOLUTION INTRAVENOUS PRN
Status: DISCONTINUED | OUTPATIENT
Start: 2024-08-25 | End: 2024-08-25 | Stop reason: SDUPTHER

## 2024-08-25 RX ORDER — ASPIRIN 81 MG/1
81 TABLET ORAL 2 TIMES DAILY
Qty: 56 TABLET | Refills: 0 | Status: SHIPPED | OUTPATIENT
Start: 2024-08-25 | End: 2024-08-25 | Stop reason: HOSPADM

## 2024-08-25 RX ORDER — SODIUM CHLORIDE 9 MG/ML
INJECTION, SOLUTION INTRAVENOUS PRN
Status: DISCONTINUED | OUTPATIENT
Start: 2024-08-25 | End: 2024-08-25 | Stop reason: HOSPADM

## 2024-08-25 RX ORDER — DEXAMETHASONE SODIUM PHOSPHATE 10 MG/ML
INJECTION, SOLUTION INTRAMUSCULAR; INTRAVENOUS PRN
Status: DISCONTINUED | OUTPATIENT
Start: 2024-08-25 | End: 2024-08-25 | Stop reason: SDUPTHER

## 2024-08-25 RX ORDER — OXYCODONE HYDROCHLORIDE 5 MG/1
5 TABLET ORAL EVERY 6 HOURS PRN
Qty: 28 TABLET | Refills: 0 | Status: SHIPPED | OUTPATIENT
Start: 2024-08-25 | End: 2024-09-01

## 2024-08-25 RX ORDER — NALOXONE HYDROCHLORIDE 0.4 MG/ML
INJECTION, SOLUTION INTRAMUSCULAR; INTRAVENOUS; SUBCUTANEOUS PRN
Status: DISCONTINUED | OUTPATIENT
Start: 2024-08-25 | End: 2024-08-25 | Stop reason: HOSPADM

## 2024-08-25 RX ORDER — SODIUM CHLORIDE 0.9 % (FLUSH) 0.9 %
5-40 SYRINGE (ML) INJECTION PRN
Status: DISCONTINUED | OUTPATIENT
Start: 2024-08-25 | End: 2024-08-25 | Stop reason: HOSPADM

## 2024-08-25 RX ORDER — HYDRALAZINE HYDROCHLORIDE 20 MG/ML
10 INJECTION INTRAMUSCULAR; INTRAVENOUS
Status: DISCONTINUED | OUTPATIENT
Start: 2024-08-25 | End: 2024-08-25 | Stop reason: HOSPADM

## 2024-08-25 RX ORDER — PROCHLORPERAZINE EDISYLATE 5 MG/ML
5 INJECTION INTRAMUSCULAR; INTRAVENOUS
Status: DISCONTINUED | OUTPATIENT
Start: 2024-08-25 | End: 2024-08-25 | Stop reason: HOSPADM

## 2024-08-25 RX ADMIN — HYDROMORPHONE HYDROCHLORIDE 0.25 MG: 1 INJECTION, SOLUTION INTRAMUSCULAR; INTRAVENOUS; SUBCUTANEOUS at 10:09

## 2024-08-25 RX ADMIN — AMLODIPINE BESYLATE 5 MG: 5 TABLET ORAL at 13:48

## 2024-08-25 RX ADMIN — CEFAZOLIN 2000 MG: 2 INJECTION, POWDER, FOR SOLUTION INTRAMUSCULAR; INTRAVENOUS at 08:18

## 2024-08-25 RX ADMIN — ROPINIROLE HYDROCHLORIDE 0.25 MG: 0.5 TABLET, FILM COATED ORAL at 21:59

## 2024-08-25 RX ADMIN — GLYCOPYRROLATE 0.6 MG: 0.2 INJECTION, SOLUTION INTRAMUSCULAR; INTRAVENOUS at 08:59

## 2024-08-25 RX ADMIN — Medication 2000 UNITS: at 13:47

## 2024-08-25 RX ADMIN — SODIUM CHLORIDE: 9 INJECTION, SOLUTION INTRAVENOUS at 08:01

## 2024-08-25 RX ADMIN — ROCURONIUM BROMIDE 50 MG: 10 SOLUTION INTRAVENOUS at 08:07

## 2024-08-25 RX ADMIN — LISINOPRIL 10 MG: 10 TABLET ORAL at 13:49

## 2024-08-25 RX ADMIN — Medication 1 TABLET: at 13:47

## 2024-08-25 RX ADMIN — WATER 1000 MG: 1 INJECTION INTRAMUSCULAR; INTRAVENOUS; SUBCUTANEOUS at 15:27

## 2024-08-25 RX ADMIN — FENTANYL CITRATE 50 MCG: 50 INJECTION, SOLUTION INTRAMUSCULAR; INTRAVENOUS at 08:34

## 2024-08-25 RX ADMIN — OXYCODONE HYDROCHLORIDE 5 MG: 5 TABLET ORAL at 05:52

## 2024-08-25 RX ADMIN — FENTANYL CITRATE 50 MCG: 50 INJECTION, SOLUTION INTRAMUSCULAR; INTRAVENOUS at 08:29

## 2024-08-25 RX ADMIN — FENTANYL CITRATE 50 MCG: 50 INJECTION, SOLUTION INTRAMUSCULAR; INTRAVENOUS at 09:12

## 2024-08-25 RX ADMIN — FENTANYL CITRATE 50 MCG: 50 INJECTION, SOLUTION INTRAMUSCULAR; INTRAVENOUS at 08:04

## 2024-08-25 RX ADMIN — PANTOPRAZOLE SODIUM 40 MG: 40 TABLET, DELAYED RELEASE ORAL at 17:23

## 2024-08-25 RX ADMIN — WATER 1000 MG: 1 INJECTION INTRAMUSCULAR; INTRAVENOUS; SUBCUTANEOUS at 22:06

## 2024-08-25 RX ADMIN — Medication 3 MG: at 08:59

## 2024-08-25 RX ADMIN — PANTOPRAZOLE SODIUM 40 MG: 40 TABLET, DELAYED RELEASE ORAL at 05:50

## 2024-08-25 RX ADMIN — HYDROMORPHONE HYDROCHLORIDE 0.25 MG: 1 INJECTION, SOLUTION INTRAMUSCULAR; INTRAVENOUS; SUBCUTANEOUS at 09:18

## 2024-08-25 RX ADMIN — HYDROMORPHONE HYDROCHLORIDE 0.25 MG: 1 INJECTION, SOLUTION INTRAMUSCULAR; INTRAVENOUS; SUBCUTANEOUS at 09:44

## 2024-08-25 RX ADMIN — ONDANSETRON 4 MG: 2 INJECTION INTRAMUSCULAR; INTRAVENOUS at 12:09

## 2024-08-25 RX ADMIN — PROPOFOL 100 MG: 10 INJECTION, EMULSION INTRAVENOUS at 08:05

## 2024-08-25 RX ADMIN — OXYCODONE HYDROCHLORIDE 5 MG: 5 TABLET ORAL at 21:59

## 2024-08-25 RX ADMIN — TRANEXAMIC ACID 1000 MG: 100 INJECTION, SOLUTION INTRAVENOUS at 08:20

## 2024-08-25 RX ADMIN — KETOROLAC TROMETHAMINE 15 MG: 15 INJECTION, SOLUTION INTRAMUSCULAR; INTRAVENOUS at 09:24

## 2024-08-25 RX ADMIN — ONDANSETRON 4 MG: 2 INJECTION INTRAMUSCULAR; INTRAVENOUS at 09:11

## 2024-08-25 RX ADMIN — LIDOCAINE HYDROCHLORIDE 100 MG: 20 INJECTION, SOLUTION INTRAVENOUS at 08:04

## 2024-08-25 RX ADMIN — CALCIUM POLYCARBOPHIL 625 MG: 625 TABLET ORAL at 14:02

## 2024-08-25 RX ADMIN — HYDROMORPHONE HYDROCHLORIDE 0.25 MG: 1 INJECTION, SOLUTION INTRAMUSCULAR; INTRAVENOUS; SUBCUTANEOUS at 09:32

## 2024-08-25 RX ADMIN — DEXAMETHASONE SODIUM PHOSPHATE 10 MG: 10 INJECTION, SOLUTION INTRAMUSCULAR; INTRAVENOUS at 08:23

## 2024-08-25 ASSESSMENT — PAIN SCALES - GENERAL
PAINLEVEL_OUTOF10: 5
PAINLEVEL_OUTOF10: 3
PAINLEVEL_OUTOF10: 9
PAINLEVEL_OUTOF10: 6
PAINLEVEL_OUTOF10: 5
PAINLEVEL_OUTOF10: 5
PAINLEVEL_OUTOF10: 4
PAINLEVEL_OUTOF10: 6

## 2024-08-25 ASSESSMENT — PAIN DESCRIPTION - ORIENTATION
ORIENTATION: RIGHT

## 2024-08-25 ASSESSMENT — PAIN DESCRIPTION - LOCATION
LOCATION: HIP;LEG
LOCATION: HIP

## 2024-08-25 ASSESSMENT — PAIN DESCRIPTION - DESCRIPTORS
DESCRIPTORS: ACHING
DESCRIPTORS: ACHING;THROBBING
DESCRIPTORS: ACHING
DESCRIPTORS: ACHING;THROBBING
DESCRIPTORS: ACHING;SPASM;SHARP
DESCRIPTORS: ACHING

## 2024-08-25 ASSESSMENT — PAIN - FUNCTIONAL ASSESSMENT
PAIN_FUNCTIONAL_ASSESSMENT: PREVENTS OR INTERFERES SOME ACTIVE ACTIVITIES AND ADLS

## 2024-08-25 ASSESSMENT — PAIN DESCRIPTION - PAIN TYPE: TYPE: ACUTE PAIN;SURGICAL PAIN

## 2024-08-25 NOTE — PROGRESS NOTES
Internal Medicine Progress Note     [facility]     Bijan Amaya D.O., JAMES Rios D.O., JAMES Pedersen D.O.     Kaitlyn Panda, MSN, APRN, NP-C  Marin Burton, MSN, APRN-CNP  Miko Vaughn, MSN, APRN, NP-C  Aaliyah Howard, MSN, APRN-CNP  Kristina Kerr, MSN, APRN, NP-C     Primary Care Physician: Mike Nunez DO   Admitting Physician:  Mateusz Smith MD  Admission date and time: 8/23/2024  2:24 PM    Room:  [address] [place] [address]  Admitting diagnosis: Fractured hip, right, closed, initial encounter ([facility]) [S72.001A]    Patient Name: Felicia Smith  MRN: [id number]    Date of Service: 8/25/2024     Subjective:  Felicia is a 84 y.o. female who was seen and examined today,8/25/2024, at the bedside. Felicia is eagerly anticipating surgical intervention this morning.  She has the expected amount of aches and pains related to being bedbound.  Cardiac clearance has been granted for procedural intervention today as well.  No family members were present during my examination.    Review of System:   Constitutional:   Denies fever or chills, weight loss or gain, fatigue or malaise.  HEENT:   Denies ear pain, sore throat, sinus or eye problems.  Cardiovascular:   Denies any chest pain, irregular heartbeats, or palpitations.   Respiratory:   Denies shortness of breath, coughing, sputum production, hemoptysis, or wheezing.  Gastrointestinal:   Denies nausea, vomiting, diarrhea, or constipation.  Denies any abdominal pain.  Genitourinary:    Denies any urgency, frequency, hematuria. Voiding  without difficulty.  Extremities:   Controlled hip pain while at rest.  She does describe hip pain with activity/movement as to be expected.  Neurology:    Denies any headache or focal neurological deficits, generalized weakness and deconditioning.  Psch:   Denies being anxious or depressed.  Musculoskeletal:    Denies  myalgias, joint complaints or back pain.  issues  History of colon cancer  Gastroesophageal reflux disease    Plan:   Felicia is resting comfortably as she prepares for surgery today.  Chronic comorbidities are otherwise stable and her pain is well-controlled.  I have performed an extensive chart review as we risk stratify for surgical intervention.  Dr. Vu has provided consultation from a cardiovascular standpoint and granted cardiac clearance for procedural intervention today.  The patient has a known history of thromboembolic disease (antiphospholipid syndrome,  ?Factor V Leiden deficiency) and had previously been on Coumadin therapy many years ago.  Secondary to intermittent falls related to syncope as well as multiple GI issues necessitating frequent endoscopy, anticoagulation therapy had been permanently discontinued.  She has undergone anesthesia multiple times for endoscopy over the past 5 years without complication.  At this point, she is granted medical clearance for procedural intervention with a somewhat higher risk profile in the setting of her previous thromboembolic disease.  We will utilize DVT prophylaxis Lovenox in the postoperative period and discuss postoperative anticoagulation with the orthopedic team.  Continue current therapy.  See orders for further plan of care.    More than 50% of my time was spent at the bedside counseling/coordinating care with the patient and/or family with face to face contact.  This time was spent reviewing notes and laboratory data as well as instructing and counseling the patient. Time I spent with the family or surrogate(s) is included only if the patient was incapable of providing the necessary information or participating in medical decisions. I also discussed the differential diagnosis and all of the proposed management plans with the patient and individuals accompanying the patient. I am readily available for any further decision-making and intervention.     Neno Rios, DO,

## 2024-08-25 NOTE — PROGRESS NOTES
Cardiology  Progress Note      SUBJECTIVE: Patient was seen in PACU just before taking her to the OR.  Family members were at bedside.  No chest pain or shortness of breath.    Current Inpatient Medications  Current Facility-Administered Medications: sodium phosphate 8.7 mmol in sodium chloride 0.9 % 250 mL IVPB, 0.16 mmol/kg, IntraVENous, PRN **OR** sodium phosphate 17.4 mmol in sodium chloride 0.9 % 250 mL IVPB, 0.32 mmol/kg, IntraVENous, PRN  magnesium sulfate 2000 mg in 50 mL IVPB premix, 2,000 mg, IntraVENous, PRN  potassium chloride (KLOR-CON M) extended release tablet 40 mEq, 40 mEq, Oral, PRN **OR** potassium bicarb-citric acid (EFFER-K) effervescent tablet 40 mEq, 40 mEq, Oral, PRN **OR** potassium chloride 10 mEq/100 mL IVPB (Peripheral Line), 10 mEq, IntraVENous, PRN  amLODIPine (NORVASC) tablet 5 mg, 5 mg, Oral, Daily  lisinopril (PRINIVIL;ZESTRIL) tablet 10 mg, 10 mg, Oral, Daily  polycarbophil (FIBERCON) tablet 625 mg, 625 mg, Oral, Daily  rOPINIRole (REQUIP) tablet 0.25 mg, 0.25 mg, Oral, Nightly  Vitamin D (CHOLECALCIFEROL) tablet 2,000 Units, 2,000 Units, Oral, Daily  pantoprazole (PROTONIX) tablet 40 mg, 40 mg, Oral, QAM AC  enoxaparin (LOVENOX) injection 40 mg, 40 mg, SubCUTAneous, Daily  oxyCODONE (ROXICODONE) immediate release tablet 5 mg, 5 mg, Oral, Q4H PRN  therapeutic multivitamin-minerals 1 tablet, 1 tablet, Oral, Daily  acetaminophen (TYLENOL) tablet 650 mg, 650 mg, Oral, Q6H PRN  Facility-Administered Medications Ordered in Other Encounters: 0.9 % sodium chloride infusion, , IntraVENous, Continuous PRN  lidocaine (cardiac) (XYLOCAINE) injection, , IntraVENous, PRN  fentaNYL (SUBLIMAZE) injection, , IntraVENous, PRN  propofol infusion, , IntraVENous, PRN  rocuronium (ZEMURON) injection, , IntraVENous, PRN  dexAMETHasone (PF) (DECADRON) injection, , IntraVENous, PRN      Physical  VITALS:  /69   Pulse 62   Temp 98.9 °F (37.2 °C) (Temporal)   Resp 18   Ht 1.6 m (5' 3\")   Wt 54.4

## 2024-08-25 NOTE — OP NOTE
Operative Note      Patient: Felicia Smith  YOB: 1940  MRN: [id number]    Date of Procedure: 8/25/2024    Pre-Op Diagnosis Codes:      * Closed right hip fracture, initial encounter ([facility]) [S72.001A]    Post-Op Diagnosis: Same - Nondisplaced right basicervical femoral neck fracture       Procedure(s):  RIGHT HIP OPEN REDUCTION INTERNAL FIXATION    Surgeon(s):  Mateusz Smith MD    Assistant:   First Assistant: Joan Gallegos RN  Resident: Nelson Urban DO; Edwin Gordon DO    Anesthesia: General    Estimated Blood Loss (mL): less than 50     Complications: None    Specimens:   * No specimens in log *    Implants:  Implant Name Type Inv. Item Serial No.  Lot No. LRB No. Used Action   NAIL IM L170MM [id number] 125DEG TROCHANTERIC FEM TI JAISON COLLIN - [id number]  NAIL IM L170MM [id number] 125DEG TROCHANTERIC FEM TI JAISON COLLIN  [employer] [id number] Right 1 Implanted   SCREW BNE L90MM DIA10.5MM CANC HIP TI ST JAISON COLLIN PARTIALLY - [id number]  SCREW BNE L90MM DIA10.5MM CANC HIP TI ST JAISON COLLIN PARTIALLY  [employer] HOW-WD [id number] Right 1 Implanted   SCREW BNE L35MM DIA5MM [place]ALL TI COLLIN FULL THRD SHFT FOR T2 IM - [id number]  SCREW BNE L35MM DIA5MM MARSHALL TI COLLIN FULL THRD SHFT FOR T2 IM  [employer]WD [id number] Right 1 Implanted         Drains: * No LDAs found *    Findings:  Infection Present At Time Of Surgery (PATOS) (choose all levels that have infection present):  No infection present  Other Findings: Nondisplaced right basicervical femoral neck fracture    Detailed Description of Procedure:   Indications:  84-year-old female who originally presented to the office after a fall.  She had presented to the emergency department several days prior.  X-rays were reported as negative.  She continued to have pain and difficulty with weightbearing.  MRI was obtained which demonstrated a nondisplaced right basicervical femoral neck fracture.  The patient was admitted

## 2024-08-25 NOTE — PROGRESS NOTES
Prog Notes:    S: In preop, no new complaints.      O:  RLE -   SILT  +TA/GSC/EHL/FHL  TTP  +Log roll    A/P:  OR today  Consent  ABX    The procedure of ORIF R hip fx was discussed with the patient today. Preoperative and postoperative care was discussed in detail. Alternative nonsurgical treatment options were again reviewed. After discussing all options the patient wishes to proceed with surgical management. The patient voices understand of the procedure and agrees to proceed. All the patient's questions. Consent was obtained. DVT prophylaxis therapy planned based on risks for DVT/PE and bleeding according to the AAOS guidelines.     The risks of ORIF R hip fx were discussed in detail with the patient including but not limited to: infection (superficial and deep), DVT, PE, arthrofibrosis, persistent pain, chronic limb swelling, neurovascular injury, dislocation or subluxation, excessive bleeding, intraoperative and/or postoperative fracture, loosening/wear/failure of implants, possible need for further surgery, cardiopulmonary and medical complications, amputation and death. The patient voices understand of the potential complications and wishes to proceed. The benefits and alternatives have been discussed.    Mateusz Smith MD

## 2024-08-25 NOTE — DISCHARGE INSTRUCTIONS
Your information:  Name: Felicia Smith  : 1940    Your instructions:    Discharge home with home care.  They will call you prior to their first visit.  Follow up with primary care provider and specialists as directed.    Orthopedics Discharge Instructions   Weight bearing Status -weightbearing as tolerated- Operative Extermity  Pain medication Per Prescription  Ice to operative/injured site for 15-30 minutes of each hour for next 3-5 days    Elevate operative/injured limb on 2 pillows at home  Continue DVT Prophylaxis as Prescribed  Wound care - Leave aquacell dressing in place until 2024 then may remove  Follow Up in Office in 2 weeks with Dr. Smith    Call the office for directions or with any questions.  Watch for these significant complications.  Call physician if they or any other problems occur:  Fever over 101°, redness, swelling or warmth at the operative site  Unrelieved nausea    Foul smelling or cloudy drainage at the operative site   Unrelieved pain    Blood soaked dressing. (Some oozing may be normal)     Numb, pale, blue, cold or tingling extremity   Call 911 anytime you think you may need emergency care. For example, call if:    You passed out (lost consciousness).     You have severe trouble breathing.     You have sudden chest pain and shortness of breath, or you cough up blood.   Call your doctor now or seek immediate medical care if:    You have pain that does not get better after you take pain medicine.     Your fingers or toes on the injured arm or leg are cool, pale, or change color.     You have tingling or numbness in your fingers or toes.     You cannot move your fingers or toes.     Your cast or splint feels too tight.     The skin under your cast or splint is burning or stinging.     You have signs of infection, such as:  Increased pain, swelling, warmth, or redness.  Red streaks leading from the incision.  Pus draining from the incision.  A fever.     You have drainage

## 2024-08-26 LAB
ALBUMIN SERPL-MCNC: 3.3 G/DL (ref 3.5–5.2)
ALP SERPL-CCNC: 112 U/L (ref 35–104)
ALT SERPL-CCNC: 10 U/L (ref 0–32)
ANION GAP SERPL CALCULATED.3IONS-SCNC: 7 MMOL/L (ref 7–16)
AST SERPL-CCNC: 18 U/L (ref 0–31)
BASOPHILS # BLD: 0.02 K/UL (ref 0–0.2)
BASOPHILS NFR BLD: 0 % (ref 0–2)
BILIRUB SERPL-MCNC: 0.4 MG/DL (ref 0–1.2)
BUN SERPL-MCNC: 28 MG/DL (ref 6–23)
CALCIUM SERPL-MCNC: 10 MG/DL (ref 8.6–10.2)
CHLORIDE SERPL-SCNC: 100 MMOL/L (ref 98–107)
CO2 SERPL-SCNC: 29 MMOL/L (ref 22–29)
CREAT SERPL-MCNC: 0.9 MG/DL (ref 0.5–1)
EOSINOPHIL # BLD: 0.01 K/UL (ref 0.05–0.5)
EOSINOPHILS RELATIVE PERCENT: 0 % (ref 0–6)
ERYTHROCYTE [DISTWIDTH] IN BLOOD BY AUTOMATED COUNT: 13.6 % (ref 11.5–15)
GFR, ESTIMATED: 64 ML/MIN/1.73M2
GLUCOSE SERPL-MCNC: 84 MG/DL (ref 74–99)
HCT VFR BLD AUTO: 40.2 % (ref 34–48)
HGB BLD-MCNC: 13 G/DL (ref 11.5–15.5)
IMM GRANULOCYTES # BLD AUTO: 0.03 K/UL (ref 0–0.58)
IMM GRANULOCYTES NFR BLD: 0 % (ref 0–5)
LYMPHOCYTES NFR BLD: 0.91 K/UL (ref 1.5–4)
LYMPHOCYTES RELATIVE PERCENT: 10 % (ref 20–42)
MAGNESIUM SERPL-MCNC: 1.9 MG/DL (ref 1.6–2.6)
MCH RBC QN AUTO: 29.4 PG (ref 26–35)
MCHC RBC AUTO-ENTMCNC: 32.3 G/DL (ref 32–34.5)
MCV RBC AUTO: 91 FL (ref 80–99.9)
MONOCYTES NFR BLD: 0.8 K/UL (ref 0.1–0.95)
MONOCYTES NFR BLD: 9 % (ref 2–12)
NEUTROPHILS NFR BLD: 80 % (ref 43–80)
NEUTS SEG NFR BLD: 6.98 K/UL (ref 1.8–7.3)
PHOSPHATE SERPL-MCNC: 3.3 MG/DL (ref 2.5–4.5)
PLATELET # BLD AUTO: 181 K/UL (ref 130–450)
PMV BLD AUTO: 10.7 FL (ref 7–12)
POTASSIUM SERPL-SCNC: 4.5 MMOL/L (ref 3.5–5)
PROT SERPL-MCNC: 5.4 G/DL (ref 6.4–8.3)
RBC # BLD AUTO: 4.42 M/UL (ref 3.5–5.5)
SODIUM SERPL-SCNC: 136 MMOL/L (ref 132–146)
WBC OTHER # BLD: 8.8 K/UL (ref 4.5–11.5)

## 2024-08-26 PROCEDURE — 36415 COLL VENOUS BLD VENIPUNCTURE: CPT

## 2024-08-26 PROCEDURE — 97165 OT EVAL LOW COMPLEX 30 MIN: CPT

## 2024-08-26 PROCEDURE — 6360000002 HC RX W HCPCS: Performed by: INTERNAL MEDICINE

## 2024-08-26 PROCEDURE — 1200000000 HC SEMI PRIVATE

## 2024-08-26 PROCEDURE — 84100 ASSAY OF PHOSPHORUS: CPT

## 2024-08-26 PROCEDURE — 6370000000 HC RX 637 (ALT 250 FOR IP)

## 2024-08-26 PROCEDURE — 97535 SELF CARE MNGMENT TRAINING: CPT

## 2024-08-26 PROCEDURE — 85025 COMPLETE CBC W/AUTO DIFF WBC: CPT

## 2024-08-26 PROCEDURE — 83735 ASSAY OF MAGNESIUM: CPT

## 2024-08-26 PROCEDURE — 6360000002 HC RX W HCPCS

## 2024-08-26 PROCEDURE — 97161 PT EVAL LOW COMPLEX 20 MIN: CPT | Performed by: PHYSICAL THERAPIST

## 2024-08-26 PROCEDURE — 80053 COMPREHEN METABOLIC PANEL: CPT

## 2024-08-26 PROCEDURE — 6370000000 HC RX 637 (ALT 250 FOR IP): Performed by: INTERNAL MEDICINE

## 2024-08-26 PROCEDURE — 97530 THERAPEUTIC ACTIVITIES: CPT | Performed by: PHYSICAL THERAPIST

## 2024-08-26 PROCEDURE — 97110 THERAPEUTIC EXERCISES: CPT | Performed by: PHYSICAL THERAPIST

## 2024-08-26 RX ORDER — CALCIUM CARBONATE 500 MG/1
500 TABLET, CHEWABLE ORAL 3 TIMES DAILY PRN
Status: DISCONTINUED | OUTPATIENT
Start: 2024-08-26 | End: 2024-08-27 | Stop reason: HOSPADM

## 2024-08-26 RX ORDER — DOCUSATE SODIUM 100 MG/1
100 CAPSULE, LIQUID FILLED ORAL 2 TIMES DAILY
Status: DISCONTINUED | OUTPATIENT
Start: 2024-08-26 | End: 2024-08-27 | Stop reason: HOSPADM

## 2024-08-26 RX ORDER — POLYETHYLENE GLYCOL 3350 17 G/17G
17 POWDER, FOR SOLUTION ORAL DAILY
Status: DISCONTINUED | OUTPATIENT
Start: 2024-08-26 | End: 2024-08-27 | Stop reason: HOSPADM

## 2024-08-26 RX ADMIN — Medication 1 TABLET: at 09:20

## 2024-08-26 RX ADMIN — ROPINIROLE HYDROCHLORIDE 0.25 MG: 0.5 TABLET, FILM COATED ORAL at 19:48

## 2024-08-26 RX ADMIN — CALCIUM POLYCARBOPHIL 625 MG: 625 TABLET ORAL at 09:48

## 2024-08-26 RX ADMIN — ONDANSETRON 4 MG: 2 INJECTION INTRAMUSCULAR; INTRAVENOUS at 20:29

## 2024-08-26 RX ADMIN — DOCUSATE SODIUM 100 MG: 100 CAPSULE, LIQUID FILLED ORAL at 19:48

## 2024-08-26 RX ADMIN — ASPIRIN 81 MG: 81 TABLET, COATED ORAL at 19:48

## 2024-08-26 RX ADMIN — OXYCODONE HYDROCHLORIDE 5 MG: 5 TABLET ORAL at 05:37

## 2024-08-26 RX ADMIN — OXYCODONE HYDROCHLORIDE 5 MG: 5 TABLET ORAL at 20:25

## 2024-08-26 RX ADMIN — PANTOPRAZOLE SODIUM 40 MG: 40 TABLET, DELAYED RELEASE ORAL at 15:25

## 2024-08-26 RX ADMIN — ASPIRIN 81 MG: 81 TABLET, COATED ORAL at 09:20

## 2024-08-26 RX ADMIN — OXYCODONE HYDROCHLORIDE 5 MG: 5 TABLET ORAL at 09:33

## 2024-08-26 RX ADMIN — Medication 2000 UNITS: at 09:20

## 2024-08-26 RX ADMIN — ONDANSETRON 4 MG: 2 INJECTION INTRAMUSCULAR; INTRAVENOUS at 11:41

## 2024-08-26 RX ADMIN — ONDANSETRON 4 MG: 2 INJECTION INTRAMUSCULAR; INTRAVENOUS at 01:01

## 2024-08-26 RX ADMIN — LISINOPRIL 10 MG: 10 TABLET ORAL at 09:20

## 2024-08-26 RX ADMIN — ENOXAPARIN SODIUM 40 MG: 100 INJECTION SUBCUTANEOUS at 09:20

## 2024-08-26 RX ADMIN — ACETAMINOPHEN 650 MG: 325 TABLET ORAL at 12:11

## 2024-08-26 RX ADMIN — PANTOPRAZOLE SODIUM 40 MG: 40 TABLET, DELAYED RELEASE ORAL at 05:34

## 2024-08-26 RX ADMIN — AMLODIPINE BESYLATE 5 MG: 5 TABLET ORAL at 09:20

## 2024-08-26 ASSESSMENT — PAIN SCALES - GENERAL
PAINLEVEL_OUTOF10: 5
PAINLEVEL_OUTOF10: 6
PAINLEVEL_OUTOF10: 10
PAINLEVEL_OUTOF10: 8
PAINLEVEL_OUTOF10: 10

## 2024-08-26 ASSESSMENT — PAIN - FUNCTIONAL ASSESSMENT: PAIN_FUNCTIONAL_ASSESSMENT: ACTIVITIES ARE NOT PREVENTED

## 2024-08-26 ASSESSMENT — PAIN DESCRIPTION - LOCATION
LOCATION: KNEE;HIP;BUTTOCKS
LOCATION: HIP;LEG
LOCATION: HIP

## 2024-08-26 ASSESSMENT — PAIN DESCRIPTION - ORIENTATION
ORIENTATION: RIGHT

## 2024-08-26 ASSESSMENT — PAIN DESCRIPTION - DESCRIPTORS
DESCRIPTORS: ACHING
DESCRIPTORS: ACHING;CRAMPING;DISCOMFORT
DESCRIPTORS: DISCOMFORT;ACHING;PRESSURE

## 2024-08-26 NOTE — ACP (ADVANCE CARE PLANNING)
Advance Care Planning   Healthcare Decision Maker:    Primary Decision Maker: Viviane Emerson - Child - [phone]

## 2024-08-26 NOTE — PROGRESS NOTES
Cardiology  Progress Note      SUBJECTIVE:  No chest pain. No dyspnea.      Current Inpatient Medications  Current Facility-Administered Medications: aspirin EC tablet 81 mg, 81 mg, Oral, BID  ondansetron (ZOFRAN) injection 4 mg, 4 mg, IntraVENous, Q6H PRN  pantoprazole (PROTONIX) tablet 40 mg, 40 mg, Oral, BID AC  sodium phosphate 8.7 mmol in sodium chloride 0.9 % 250 mL IVPB, 0.16 mmol/kg, IntraVENous, PRN **OR** sodium phosphate 17.4 mmol in sodium chloride 0.9 % 250 mL IVPB, 0.32 mmol/kg, IntraVENous, PRN  magnesium sulfate 2000 mg in 50 mL IVPB premix, 2,000 mg, IntraVENous, PRN  potassium chloride (KLOR-CON M) extended release tablet 40 mEq, 40 mEq, Oral, PRN **OR** potassium bicarb-citric acid (EFFER-K) effervescent tablet 40 mEq, 40 mEq, Oral, PRN **OR** potassium chloride 10 mEq/100 mL IVPB (Peripheral Line), 10 mEq, IntraVENous, PRN  amLODIPine (NORVASC) tablet 5 mg, 5 mg, Oral, Daily  lisinopril (PRINIVIL;ZESTRIL) tablet 10 mg, 10 mg, Oral, Daily  polycarbophil (FIBERCON) tablet 625 mg, 625 mg, Oral, Daily  rOPINIRole (REQUIP) tablet 0.25 mg, 0.25 mg, Oral, Nightly  Vitamin D (CHOLECALCIFEROL) tablet 2,000 Units, 2,000 Units, Oral, Daily  enoxaparin (LOVENOX) injection 40 mg, 40 mg, SubCUTAneous, Daily  oxyCODONE (ROXICODONE) immediate release tablet 5 mg, 5 mg, Oral, Q4H PRN  therapeutic multivitamin-minerals 1 tablet, 1 tablet, Oral, Daily  acetaminophen (TYLENOL) tablet 650 mg, 650 mg, Oral, Q6H PRN      Physical  VITALS:  BP (!) 152/82   Pulse 64   Temp 97.5 °F (36.4 °C) (Temporal)   Resp 18   Ht 1.6 m (5' 3\")   Wt 54.4 kg (120 lb)   LMP  (LMP Unknown)   SpO2 96%   BMI 21.26 kg/m²   CURRENT TEMPERATURE:  Temp: 97.5 °F (36.4 °C)  CONSTITUTIONAL: No acute distress.  EYES: Vision is intact.  ENT: No sore throat.  No ear drainage.  NECK: No JVD.  BACK: Symmetric.  LUNGS:  diminished breath sounds right base and left base  CARDIOVASCULAR:  normal S1 and S2, no S3, and murmurs include systolic

## 2024-08-26 NOTE — PLAN OF CARE
Problem: Discharge Planning  Goal: Discharge to home or other facility with appropriate resources  8/25/2024 2331 by David Garner RN  Outcome: Progressing  8/25/2024 1521 by Ebony Cobos RN  Outcome: Progressing     Problem: Pain  Goal: Verbalizes/displays adequate comfort level or baseline comfort level  8/25/2024 2331 by David Garner RN  Outcome: Progressing  8/25/2024 1521 by Ebony Cobos RN  Outcome: Progressing     Problem: ABCDS Injury Assessment  Goal: Absence of physical injury  8/25/2024 2331 by David Garner RN  Outcome: Progressing  8/25/2024 1521 by Ebony Cobos RN  Outcome: Progressing     Problem: Safety - Adult  Goal: Free from fall injury  8/25/2024 2331 by David Garner RN  Outcome: Progressing  8/25/2024 1521 by Ebony Cobos RN  Outcome: Progressing     Problem: Skin/Tissue Integrity  Goal: Absence of new skin breakdown  Description: 1.  Monitor for areas of redness and/or skin breakdown  2.  Assess vascular access sites hourly  3.  Every 4-6 hours minimum:  Change oxygen saturation probe site  4.  Every 4-6 hours:  If on nasal continuous positive airway pressure, respiratory therapy assess nares and determine need for appliance change or resting period.  Outcome: Progressing

## 2024-08-26 NOTE — PROGRESS NOTES
Little  How much help is needed for taking care of personal grooming?: A Little  How much help for eating meals?: None  AM-PAC Inpatient Daily Activity Raw Score: 19  AM-PAC Inpatient ADL T-Scale Score : 40.22  ADL Inpatient CMS 0-100% Score: 42.8  ADL Inpatient CMS G-Code Modifier : CK     Functional Assessment:    Initial Eval Status  Date: 8/26/24 Treatment Status  Date: STGs = LTGs  Time frame: 10-14 days   Feeding Independent to bring food to mouth. Set up provided for lunch tray.   Nausea after taking a few bits of food, nursing notified.   Independent    Grooming Supervision   Independent    UB Dressing Supervision   Independent    LB Dressing Supervision to don shoes while seated EOB  Independent    Bathing Minimal Assist  Modified [place]    Toileting Supervision for hygiene and to stand at sink level to wash and dry hands.   Independent    Bed Mobility  Supine to sit: Supervision    Sit to supine: N/T as pt was up in chair eating lunch   Rolling: N/T     Supine to sit: Independent   Sit to supine: Independent   Rolling:Independent     Functional Transfers Supervision from EOB to wheeled walker.   Supervision for transfer to and from low commode with Minimal verbal cues to use grab bar for safe commode transfer.   Supervision for transfer from standing to bedside chair  Transfer training with verbal cues for hand placement throughout session to improve safety.   Independent    Functional Mobility Supervision with wheeled walker to improve balance to and from bathroom, up to sink, and up to bedside chair, verbal cues for walker sequence and safety.   Modified [place]    Balance Sitting:     Static: good     Dynamic: fair plus  Standing: fair plus with wheeled walker  Sitting:     Static: good     Dynamic: good   Standing: good  with wheeled walker   Activity Tolerance Fair plus   good    Visual/  Perceptual Glasses: yes                 Hand Dominance: right      AROM (PROM) Strength Additional  Info:  Goal:   RUE  WFL 4/5 good  and wfl FMC/dexterity noted during ADL tasks   Improve overall RUE strength  for participation in functional tasks   LUE WFL 4/5 good  and wfl FMC/dexterity noted during ADL tasks   Improve overall LUE strength  for participation in functional tasks     Hearing: WFL   Sensation:  No c/o numbness or tingling  Tone: WFL   Edema: no     Comments: Upon arrival the patient was supine.  At end of session, patient was up in chair eating lunch, call light and phone within reach, all lines and tubes intact.  Overall patient demonstrated decreased independence and safety during completion of ADL/functional transfer/mobility tasks.  Pt would benefit from continued skilled OT to increase safety and independence with completion of ADL/IADL tasks for functional independence and quality of life.    Treatment: OT treatment provided this date includes:  Instruction/training on safety and adapted techniques for completion of ADLs  Instruction/training on safe functional mobility/transfer techniques  Instruction/training on energy conservation/work simplification for completion of ADL's  Instruction/training on proper positioning/alignment to prevent contractures   Instruction/training in weight bearing status and walker sequence  Instruction/training in lower body dressing techniques   Instruction/training in car transfer technique and seat positioning  Instruction/training in Aquacel dressing and bathing  Instruction/training in use of easy-slide donning aide to don compression stockings (easy slide provided)     Rehab Potential: Good for established goals.      Patient / Family Goal: return home       Patient and/or family were instructed on functional diagnosis, prognosis/goals and OT plan of care. Demonstrated good understanding.     Eval Complexity: Low    Time In: 11:15am   Time Out: 1:40am    Total Treatment Time: 10      Min Units   OT Eval Low 97165  X  1    OT Eval Medium 68058

## 2024-08-26 NOTE — PROGRESS NOTES
Internal Medicine Progress Note     [facility]     Bijan Amaya D.O., JAMES Rios D.O., JAMES Pedersen D.O.     Kaitlyn Panda, MSN, APRN, NP-C  Marin Burton, MSN, APRN-CNP  Miko Vaughn, MSN, APRN, NP-C  Aaliyah Howard, MSN, APRN-CNP  Kristina Kerr, MSN, APRN, NP-C     Primary Care Physician: Mike Nunez DO   Admitting Physician:  Mateusz Smith MD  Admission date and time: 8/23/2024  2:24 PM    Room:  [address] [place] [address]  Admitting diagnosis: Fractured hip, right, closed, initial encounter ([facility]) [S72.001A]    Patient Name: Felicia Smith  MRN: [id number]    Date of Service: 8/26/2024     Subjective:  Felicia is a 84 y.o. female who was seen and examined today,8/26/2024, at the bedside. Felicia was evaluated in the presence of her son today.  She describes postoperative hip pain as to be expected.  As it was the weekend, she did not work with therapy yesterday.  Her goal is to return home versus staying at her son's house.  We had an extensive conversation regarding her past medical history.    Review of System:   Constitutional:   Denies fever or chills, weight loss or gain, fatigue or malaise.  HEENT:   Denies ear pain, sore throat, sinus or eye problems.  Cardiovascular:   Denies any chest pain, irregular heartbeats, or palpitations.   Respiratory:   Denies shortness of breath, coughing, sputum production, hemoptysis, or wheezing.  Gastrointestinal:   Denies nausea, vomiting, diarrhea, or constipation.  Denies any abdominal pain.  Genitourinary:    Denies any urgency, frequency, hematuria. Voiding  without difficulty.  Extremities:   Postoperative hip pain as to be expected.  Neurology:    Denies any headache or focal neurological deficits, generalized weakness and deconditioning.  Psch:   Denies being anxious or depressed.  Musculoskeletal:    Denies  myalgias, joint complaints or back pain.   Integumentary:   Denies any rashes,  ulcers, or excoriations.  Denies bruising.  Hematologic/Lymphatic:  Denies bruising or bleeding.    Physical Exam:  No intake/output data recorded.    Intake/Output Summary (Last 24 hours) at 8/26/2024 0656  Last data filed at 8/25/2024 1834  Gross per 24 hour   Intake 960 ml   Output 350 ml   Net 610 ml   I/O last 3 completed shifts:  In: 1560 [P.O.:960; I.V.:500; IV Piggyback:100]  Out: 1400 [Urine:1350; Blood:50]  Patient Vitals for the past 96 hrs (Last 3 readings):   Weight   08/23/24 1545 54.4 kg (120 lb)     Vital Signs:   Blood pressure (!) 152/82, pulse 64, temperature 97.5 °F (36.4 °C), temperature source Temporal, resp. rate 18, height 1.6 m (5' 3\"), weight 54.4 kg (120 lb), SpO2 96%, not currently breastfeeding.    General appearance:  Alert, responsive, oriented to person, place, and time. Well preserved, alert, no distress.  Head:  Normocephalic. No masses, lesions or tenderness.  Eyes:  PERRLA.  EOMI.  Sclera clear.  Buccal mucosa moist.  ENT:  Ears normal. Mucosa normal.  Neck:    Supple. Trachea midline. No thyromegaly. No JVD. No bruits.  Heart:    Rhythm regular. Rate controlled.  No murmurs.  Lungs:    Symmetrical. Clear to auscultation bilaterally.  No wheezes. No rhonchi. No rales.  Abdomen:   Soft. Non-tender. Non-distended. Bowel sounds positive. No organomegaly or masses.  No pain on palpation.  Extremities:    Peripheral pulses present.  No peripheral edema.  No ulcers. No cyanosis. No clubbing.  Neurologic:    Alert x 3.  No focal deficit.  Cranial nerves grossly intact. No focal weakness.  Psych:   Behavior is normal. Mood appears normal. Speech is not rapid and/or pressured.  Musculoskeletal:   Spine ROM normal. Muscular strength intact. Gait not assessed.  Integumentary:  Postoperative hip dressings are in place.  Genitalia/Breast:  Deferred    Medication:  Scheduled Meds:   aspirin  81 mg Oral BID    pantoprazole  40 mg Oral BID AC    amLODIPine  5 mg Oral Daily    lisinopril  10 mg

## 2024-08-26 NOTE — PROGRESS NOTES
Department of Orthopedic Surgery  Resident Progress Note    POD #1 status post right hip ORIF.  Patient seen and examined, resting comfortably in bed.  No acute issues overnight.    VITALS:  BP (!) 152/82   Pulse 64   Temp 97.5 °F (36.4 °C) (Temporal)   Resp 18   Ht 1.6 m (5' 3\")   Wt 54.4 kg (120 lb)   LMP  (LMP Unknown)   SpO2 96%   BMI 21.26 kg/m²     General: alert and oriented to person, place and time, well-developed and well-nourished, in no acute distress    MUSCULOSKELETAL:   right lower extremity:  Dressings in place, clean dry and intact  Thigh/lower leg compartments soft and compressible  Calf soft and nontender  + Plantarflexion, dorsiflexion, great toe dorsiflexion   2+/4 DP and PT pulses.  Foot warm and well-perfused  Distal sensation grossly intact to superficial and deep peroneal, tibial, sural, saphenous nerves     CBC:   Lab Results   Component Value Date/Time    WBC 8.8 08/26/2024 04:24 AM    HGB 13.0 08/26/2024 04:24 AM    HCT 40.2 08/26/2024 04:24 AM     08/26/2024 04:24 AM     PT/INR:    Lab Results   Component Value Date/Time    PROTIME 11.0 06/20/2024 11:49 PM    PROTIME 21.1 07/16/2015 09:33 AM    INR 1.0 06/20/2024 11:49 PM       ASSESSMENT  S/P right hip ORIF 8/25    PLAN      Continue physical therapy and protocol: WBAT -right LE  24 hour abx coverage -complete  Deep venous thrombosis prophylaxis -aspirin 81 mg twice daily, early mobilization  PT/OT  Pain Control: IV and PO, wean to p.o.  Monitor H&H  D/C Plan: Pending PT OT, internal medicine.  Patient appropriate for discharge from orthopedic surgery standpoint.  Follow-up outpatient with Dr. Smith 2 weeks    Electronically signed by Adal Jordan DO on 8/26/2024 at 6:40 AM

## 2024-08-26 NOTE — CARE COORDINATION
Case Management Assessment  Initial Evaluation    Date/Time of Evaluation: 8/26/2024 10:17 AM  Assessment Completed by: FELIZ Duval    If patient is discharged prior to next notation, then this note serves as note for discharge by case management.    Patient Name: Felicia Smith                   YOB: 1940  Diagnosis: Fractured hip, right, closed, initial encounter ([facility]) [S72.001A]                   Date / Time: 8/23/2024  2:24 PM    Patient Admission Status: Inpatient   Readmission Risk (Low < 19, Mod (19-27), High > 27): Readmission Risk Score: 13.9    Current PCP: Mike Nunez, DO  PCP verified by CM? Yes    Chart Reviewed: Yes      History Provided by: Patient  Patient Orientation: Alert and Oriented    Patient Cognition: Alert    Hospitalization in the last 30 days (Readmission):  No    If yes, Readmission Assessment in CM Navigator will be completed.    Advance Directives:      Code Status: Full Code   Patient's Primary Decision Maker is: Legal Next of Kin    Primary Decision Maker: Viviane Emerson  Child - [phone]    Discharge Planning:    Patient lives with: Alone Type of Home: Apartment  Primary Care Giver: Self  Patient Support Systems include: Children   Current Financial resources: Medicare  Current community resources: ECF/Home Care  Current services prior to admission: None            Current DME:              Type of Home Care services:  None    ADLS  Prior functional level: Independent in ADLs/IADLs  Current functional level: Assistance with the following:, Mobility    PT AM-PAC:   /24  OT AM-PAC:   /24    Family can provide assistance at DC: No  Would you like Case Management to discuss the discharge plan with any other family members/significant others, and if so, who? No  Plans to Return to Present Housing: Unknown at present    Potential Assistance needed at discharge: N/A            Potential DME:    Patient expects to discharge to: Apartment  Plan for

## 2024-08-26 NOTE — PROGRESS NOTES
Physical Therapy  Physical Therapy Initial Evaluation/Plan of Care    Room #:  0318/0318-01  Patient Name: Felicia Smith  YOB: 1940  MRN: [id number]    Date of Service: 8/26/2024     Tentative placement recommendation: Home with Home Health Physical Therapy with family assist/supervision   Equipment recommendation: Wheeled Walker      Evaluating Physical Therapist: Demian Lopez, PT, DPT #[id number]      Specific Provider Orders/Date/Referring Provider :     08/25/24 1800    PT eval and treat  Start:  08/25/24 1800,   End:  08/25/24 1800,   ONE TIME,   Standing Count:  1 Occurrences,   R       Mateusz Smith MD Acknowledge New    Admitting Diagnosis:   Fractured hip, right, closed, initial encounter ([facility]) [S72.001A]      Surgery: R hip ORIF on 8/25/24      Patient Active Problem List   Diagnosis    Pulmonary embolism (HCC)    HTN (hypertension)    Hyperlipidemia    GERD (gastroesophageal reflux disease)    Colon cancer ([facility])    Hiatal hernia    Syncope and collapse    Age-related macular degeneration    Nonsenile cataract    Visual disturbance    Fractured hip, right, closed, initial encounter ([facility])    Femur fracture ([facility])        ASSESSMENT of Current Deficits Patient exhibits decreased strength, balance, and endurance impairing functional mobility, transfers, gait , gait distance, and tolerance to activity. Pt mildly unsteady with ambulation with no LOB, dizziness, or SOB during session. Pt given VC for WW approximation, safety, and RLE alignment during ambulation. Pt agreeable to seated exercises following function.        PHYSICAL THERAPY  PLAN OF CARE       Physical therapy plan of care is established based on physician order,  patient diagnosis and clinical assessment    Current Treatment Recommendations:    -Bed Mobility: Lower and upper extremity exercises, and trunk control activities  -Sitting Balance: Incorporate reaching activities to activate trunk muscles , Hands on support to maintain  alarm, and WBAT RLE, WBAT LUE from L shoulder fx 6 weeks      Social history: Patient lives alone in a apartment    with  1 threshold steps  to enter home Tub shower , grab bars      Equipment owned: Wheelchair, Standard Walker, Shower chair, and grab bars    AM-PAC Basic Mobility       AM-PAC Basic Mobility - Inpatient   How much help is needed turning from your back to your side while in a flat bed without using bedrails?: A Little  How much help is needed moving from lying on your back to sitting on the side of a flat bed without using bedrails?: A Little  How much help is needed moving to and from a bed to a chair?: A Little  How much help is needed standing up from a chair using your arms?: A Little  How much help is needed walking in hospital room?: A Little  How much help is needed climbing 3-5 steps with a railing?: A Little  AM-[facility] Inpatient Mobility Raw Score : 18  AM-PAC Inpatient T-Scale Score : 43.63  Mobility Inpatient CMS 0-100% Score: 46.58  Mobility Inpatient CMS G-Code Modifier : CK    Nursing cleared patient for PT evaluation. The admitting diagnosis and active problem list as listed above have been reviewed prior to the initiation of this evaluation.    OBJECTIVE:   Initial Evaluation  Date: 8/26/2024 Treatment Date:     Short Term/ Long Term   Goals   Was pt agreeable to Eval/treatment? Yes  To be met in 2 days   Pain level   5/10  R hip     Bed Mobility  Using rails and head of bed elevated:     Rolling: Supervision     Supine to sit: Supervision     Sit to supine: Supervision     Scooting: Supervision     Rolling: Independent    Supine to sit: Independent    Sit to supine: Independent    Scooting: Independent     Transfers Sit to stand: Supervision    Sit to stand: Independent    Ambulation     2 x 100 feet using  wheeled walker with Minimal progressing to supervision   for walker approximation, balance, weight shift, and safety    > 200 feet using  wheeled walker with Supervision     ROM

## 2024-08-26 NOTE — PLAN OF CARE
Problem: Musculoskeletal - Adult  Goal: Return mobility to safest level of function  Outcome: Progressing     Problem: Musculoskeletal - Adult  Goal: Maintain proper alignment of affected body part  Outcome: Progressing     Problem: Musculoskeletal - Adult  Goal: Return ADL status to a safe level of function  Outcome: Progressing     Problem: ABCDS Injury Assessment  Goal: Absence of physical injury  8/26/2024 1254 by Margarita Pate, RN  Outcome: Progressing     Problem: Safety - Adult  Goal: Free from fall injury  8/26/2024 1254 by Margarita Pate, RN  Outcome: Progressing

## 2024-08-27 VITALS
RESPIRATION RATE: 16 BRPM | TEMPERATURE: 97.8 F | HEIGHT: 63 IN | WEIGHT: 120 LBS | HEART RATE: 61 BPM | BODY MASS INDEX: 21.26 KG/M2 | SYSTOLIC BLOOD PRESSURE: 131 MMHG | DIASTOLIC BLOOD PRESSURE: 71 MMHG | OXYGEN SATURATION: 95 %

## 2024-08-27 LAB
ALBUMIN SERPL-MCNC: 3.2 G/DL (ref 3.5–5.2)
ALP SERPL-CCNC: 101 U/L (ref 35–104)
ALT SERPL-CCNC: 8 U/L (ref 0–32)
ANION GAP SERPL CALCULATED.3IONS-SCNC: 4 MMOL/L (ref 7–16)
AST SERPL-CCNC: 18 U/L (ref 0–31)
BASOPHILS # BLD: 0.04 K/UL (ref 0–0.2)
BASOPHILS NFR BLD: 1 % (ref 0–2)
BILIRUB SERPL-MCNC: 0.4 MG/DL (ref 0–1.2)
BUN SERPL-MCNC: 22 MG/DL (ref 6–23)
CALCIUM SERPL-MCNC: 9.4 MG/DL (ref 8.6–10.2)
CHLORIDE SERPL-SCNC: 100 MMOL/L (ref 98–107)
CO2 SERPL-SCNC: 30 MMOL/L (ref 22–29)
CREAT SERPL-MCNC: 0.8 MG/DL (ref 0.5–1)
EOSINOPHIL # BLD: 0.07 K/UL (ref 0.05–0.5)
EOSINOPHILS RELATIVE PERCENT: 1 % (ref 0–6)
ERYTHROCYTE [DISTWIDTH] IN BLOOD BY AUTOMATED COUNT: 13.9 % (ref 11.5–15)
GFR, ESTIMATED: 74 ML/MIN/1.73M2
GLUCOSE SERPL-MCNC: 94 MG/DL (ref 74–99)
HCT VFR BLD AUTO: 38 % (ref 34–48)
HGB BLD-MCNC: 12.6 G/DL (ref 11.5–15.5)
IMM GRANULOCYTES # BLD AUTO: <0.03 K/UL (ref 0–0.58)
IMM GRANULOCYTES NFR BLD: 0 % (ref 0–5)
LYMPHOCYTES NFR BLD: 1.07 K/UL (ref 1.5–4)
LYMPHOCYTES RELATIVE PERCENT: 14 % (ref 20–42)
MAGNESIUM SERPL-MCNC: 1.7 MG/DL (ref 1.6–2.6)
MCH RBC QN AUTO: 30.1 PG (ref 26–35)
MCHC RBC AUTO-ENTMCNC: 33.2 G/DL (ref 32–34.5)
MCV RBC AUTO: 90.7 FL (ref 80–99.9)
MONOCYTES NFR BLD: 0.67 K/UL (ref 0.1–0.95)
MONOCYTES NFR BLD: 9 % (ref 2–12)
NEUTROPHILS NFR BLD: 75 % (ref 43–80)
NEUTS SEG NFR BLD: 5.74 K/UL (ref 1.8–7.3)
PHOSPHATE SERPL-MCNC: 2.3 MG/DL (ref 2.5–4.5)
PLATELET # BLD AUTO: 158 K/UL (ref 130–450)
PMV BLD AUTO: 10.9 FL (ref 7–12)
POTASSIUM SERPL-SCNC: 4.3 MMOL/L (ref 3.5–5)
PROT SERPL-MCNC: 5.3 G/DL (ref 6.4–8.3)
RBC # BLD AUTO: 4.19 M/UL (ref 3.5–5.5)
SODIUM SERPL-SCNC: 134 MMOL/L (ref 132–146)
WBC OTHER # BLD: 7.6 K/UL (ref 4.5–11.5)

## 2024-08-27 PROCEDURE — 84100 ASSAY OF PHOSPHORUS: CPT

## 2024-08-27 PROCEDURE — 83735 ASSAY OF MAGNESIUM: CPT

## 2024-08-27 PROCEDURE — 6370000000 HC RX 637 (ALT 250 FOR IP): Performed by: INTERNAL MEDICINE

## 2024-08-27 PROCEDURE — 36415 COLL VENOUS BLD VENIPUNCTURE: CPT

## 2024-08-27 PROCEDURE — 6360000002 HC RX W HCPCS

## 2024-08-27 PROCEDURE — 6370000000 HC RX 637 (ALT 250 FOR IP)

## 2024-08-27 PROCEDURE — 80053 COMPREHEN METABOLIC PANEL: CPT

## 2024-08-27 PROCEDURE — 85025 COMPLETE CBC W/AUTO DIFF WBC: CPT

## 2024-08-27 RX ORDER — POLYETHYLENE GLYCOL 3350 17 G/17G
17 POWDER, FOR SOLUTION ORAL DAILY
Qty: 30 PACKET | Refills: 0 | Status: SHIPPED | OUTPATIENT
Start: 2024-08-27 | End: 2024-09-26

## 2024-08-27 RX ADMIN — ASPIRIN 81 MG: 81 TABLET, COATED ORAL at 09:07

## 2024-08-27 RX ADMIN — Medication 2000 UNITS: at 09:07

## 2024-08-27 RX ADMIN — DOCUSATE SODIUM 100 MG: 100 CAPSULE, LIQUID FILLED ORAL at 09:07

## 2024-08-27 RX ADMIN — ENOXAPARIN SODIUM 40 MG: 100 INJECTION SUBCUTANEOUS at 09:08

## 2024-08-27 RX ADMIN — Medication 1 TABLET: at 09:07

## 2024-08-27 RX ADMIN — CALCIUM POLYCARBOPHIL 625 MG: 625 TABLET ORAL at 09:07

## 2024-08-27 RX ADMIN — POLYETHYLENE GLYCOL 3350 17 G: 17 POWDER, FOR SOLUTION ORAL at 09:08

## 2024-08-27 RX ADMIN — AMLODIPINE BESYLATE 5 MG: 5 TABLET ORAL at 09:07

## 2024-08-27 RX ADMIN — LISINOPRIL 10 MG: 10 TABLET ORAL at 09:08

## 2024-08-27 RX ADMIN — PANTOPRAZOLE SODIUM 40 MG: 40 TABLET, DELAYED RELEASE ORAL at 05:23

## 2024-08-27 NOTE — CARE COORDINATION
DC order noted.  Per patient, she is planning to go to her sister, Gabby'cally mcgrath on DC.  [address] [facility].  Gabby at bedside and confirms this to be plan.  Her phone number is [phone].  WW delivered yesterday, have notified Gabby at [facility] of DC and orders.  They will reach out to patient for SOC.

## 2024-08-27 NOTE — PLAN OF CARE
Problem: Discharge Planning  Goal: Discharge to home or other facility with appropriate resources  8/27/2024 0951 by Margarita Pate RN  Outcome: Progressing     Problem: Pain  Goal: Verbalizes/displays adequate comfort level or baseline comfort level  8/27/2024 0951 by Margarita Pate RN  Outcome: Progressing     Problem: ABCDS Injury Assessment  Goal: Absence of physical injury  8/27/2024 0951 by Margarita Pate RN  Outcome: Progressing     Problem: Safety - Adult  Goal: Free from fall injury  8/27/2024 0951 by Margarita Pate RN  Outcome: Progressing     Problem: Skin/Tissue Integrity  Goal: Absence of new skin breakdown  Description: 1.  Monitor for areas of redness and/or skin breakdown  2.  Assess vascular access sites hourly  3.  Every 4-6 hours minimum:  Change oxygen saturation probe site  4.  Every 4-6 hours:  If on nasal continuous positive airway pressure, respiratory therapy assess nares and determine need for appliance change or resting period.  8/27/2024 0951 by Margarita Pate RN  Outcome: Progressing     Problem: Musculoskeletal - Adult  Goal: Maintain proper alignment of affected body part  8/27/2024 0951 by Margarita Pate RN  Outcome: Progressing     Problem: Musculoskeletal - Adult  Goal: Return ADL status to a safe level of function  8/27/2024 0951 by Margarita Pate RN  Outcome: Progressing

## 2024-08-27 NOTE — PROGRESS NOTES
Cardiology  Progress Note      SUBJECTIVE:.  Patient was sitting up in bed when I came to see her this morning.  Family members are at bedside.  No chest pain. No dyspnea.  No acute distress.    Current Inpatient Medications  Current Facility-Administered Medications: polyethylene glycol (GLYCOLAX) packet 17 g, 17 g, Oral, Daily  docusate sodium (COLACE) capsule 100 mg, 100 mg, Oral, BID  calcium carbonate (TUMS) chewable tablet 500 mg, 500 mg, Oral, TID PRN  aspirin EC tablet 81 mg, 81 mg, Oral, BID  ondansetron (ZOFRAN) injection 4 mg, 4 mg, IntraVENous, Q6H PRN  pantoprazole (PROTONIX) tablet 40 mg, 40 mg, Oral, BID AC  sodium phosphate 8.7 mmol in sodium chloride 0.9 % 250 mL IVPB, 0.16 mmol/kg, IntraVENous, PRN **OR** sodium phosphate 17.4 mmol in sodium chloride 0.9 % 250 mL IVPB, 0.32 mmol/kg, IntraVENous, PRN  magnesium sulfate 2000 mg in 50 mL IVPB premix, 2,000 mg, IntraVENous, PRN  potassium chloride (KLOR-CON M) extended release tablet 40 mEq, 40 mEq, Oral, PRN **OR** potassium bicarb-citric acid (EFFER-K) effervescent tablet 40 mEq, 40 mEq, Oral, PRN **OR** potassium chloride 10 mEq/100 mL IVPB (Peripheral Line), 10 mEq, IntraVENous, PRN  amLODIPine (NORVASC) tablet 5 mg, 5 mg, Oral, Daily  lisinopril (PRINIVIL;ZESTRIL) tablet 10 mg, 10 mg, Oral, Daily  polycarbophil (FIBERCON) tablet 625 mg, 625 mg, Oral, Daily  rOPINIRole (REQUIP) tablet 0.25 mg, 0.25 mg, Oral, Nightly  Vitamin D (CHOLECALCIFEROL) tablet 2,000 Units, 2,000 Units, Oral, Daily  enoxaparin (LOVENOX) injection 40 mg, 40 mg, SubCUTAneous, Daily  oxyCODONE (ROXICODONE) immediate release tablet 5 mg, 5 mg, Oral, Q4H PRN  therapeutic multivitamin-minerals 1 tablet, 1 tablet, Oral, Daily  acetaminophen (TYLENOL) tablet 650 mg, 650 mg, Oral, Q6H PRN      Physical  VITALS:  BP (!) 141/81   Pulse 68   Temp 97.8 °F (36.6 °C) (Oral)   Resp 16   Ht 1.6 m (5' 3\")   Wt 54.4 kg (120 lb)   LMP  (LMP Unknown)   SpO2 95%   BMI 21.26 kg/m²

## 2024-08-27 NOTE — PROGRESS NOTES
Department of Orthopedic Surgery  Resident Progress Note    POD #2 status post right hip ORIF.  Patient seen and examined, resting comfortably in bed.  No acute issues overnight.    VITALS:  /71   Pulse 61   Temp 97.8 °F (36.6 °C) (Oral)   Resp 16   Ht 1.6 m (5' 3\")   Wt 54.4 kg (120 lb)   LMP  (LMP Unknown)   SpO2 95%   BMI 21.26 kg/m²     General: alert and oriented to person, place and time, well-developed and well-nourished, in no acute distress    MUSCULOSKELETAL:   right lower extremity:  Dressings in place, clean dry and intact  Thigh/lower leg compartments soft and compressible  Calf soft and nontender  + Plantarflexion, dorsiflexion, great toe dorsiflexion   2+/4 DP and PT pulses.  Foot warm and well-perfused  Distal sensation grossly intact to superficial and deep peroneal, tibial, sural, saphenous nerves     CBC:   Lab Results   Component Value Date/Time    WBC 7.6 08/27/2024 04:39 AM    HGB 12.6 08/27/2024 04:39 AM    HCT 38.0 08/27/2024 04:39 AM     08/27/2024 04:39 AM     PT/INR:    Lab Results   Component Value Date/Time    PROTIME 11.0 06/20/2024 11:49 PM    PROTIME 21.1 07/16/2015 09:33 AM    INR 1.0 06/20/2024 11:49 PM       ASSESSMENT  S/P right hip ORIF 8/25    PLAN      Continue physical therapy and protocol: WBAT -right LE  24 hour abx coverage -complete  Deep venous thrombosis prophylaxis -aspirin 81 mg twice daily, early mobilization  PT/OT  Pain Control: IV and PO, wean to p.o.  Monitor H&H  D/C Plan: Pending PT OT, internal medicine.  Patient appropriate for discharge from orthopedic surgery standpoint.  Follow-up outpatient with Dr. Smith 2 weeks    Electronically signed by Adal Jordan DO on 8/27/2024 at 9:23 AM

## 2024-08-27 NOTE — PROGRESS NOTES
CLINICAL PHARMACY NOTE: MEDS TO BEDS    Total # of Prescriptions Filled: 3   The following medications were delivered to the patient:  Eliquis 2.5 mg  Roxicodone 5 mg  Polyethylene powder    Additional Documentation:

## 2024-08-27 NOTE — PLAN OF CARE
Problem: Discharge Planning  Goal: Discharge to home or other facility with appropriate resources  8/26/2024 2118 by Jada Henning RN  Outcome: Progressing  8/26/2024 1254 by Margarita Pate RN  Outcome: Progressing     Problem: Pain  Goal: Verbalizes/displays adequate comfort level or baseline comfort level  8/26/2024 2118 by Jada Henning RN  Outcome: Progressing  8/26/2024 1254 by Margarita Pate RN  Outcome: Progressing     Problem: ABCDS Injury Assessment  Goal: Absence of physical injury  8/26/2024 2118 by Jada Henning RN  Outcome: Progressing  8/26/2024 1254 by Margarita Pate RN  Outcome: Progressing     Problem: Safety - Adult  Goal: Free from fall injury  8/26/2024 2118 by Jada Henning RN  Outcome: Progressing  8/26/2024 1254 by Margarita Pate RN  Outcome: Progressing     Problem: Skin/Tissue Integrity  Goal: Absence of new skin breakdown  Description: 1.  Monitor for areas of redness and/or skin breakdown  2.  Assess vascular access sites hourly  3.  Every 4-6 hours minimum:  Change oxygen saturation probe site  4.  Every 4-6 hours:  If on nasal continuous positive airway pressure, respiratory therapy assess nares and determine need for appliance change or resting period.  8/26/2024 2118 by Jada Henning RN  Outcome: Progressing  8/26/2024 1254 by Margarita Pate RN  Outcome: Progressing     Problem: Musculoskeletal - Adult  Goal: Return mobility to safest level of function  8/26/2024 2118 by Jada Henning RN  Outcome: Progressing  8/26/2024 1254 by Margarita Pate RN  Outcome: Progressing  Goal: Maintain proper alignment of affected body part  8/26/2024 2118 by Jada Henning RN  Outcome: Progressing  8/26/2024 1254 by Margarita Pate RN  Outcome: Progressing  Goal: Return ADL status to a safe level of function  8/26/2024 2118 by Jada Henning RN  Outcome: Progressing  8/26/2024 1254 by Margarita Pate RN  Outcome: Progressing

## 2024-08-27 NOTE — DISCHARGE SUMMARY
Internal Medicine Progress Note     [facility]     Bijan Amaya D.O., WONODarienI.                         Neno Rios D.O., WONODarienIDarien Pedersen D.O.     Kaitlyn Panda, MSN, APRN, NP-C  Marin Burton, MSN, APRN-CNP  Miko Vaughn, MSN, APRN, NP-C  Aaliyah Howard, MSN, APRN-CNP  Kristina Kerr, MSN, APRN, NP-C       Internal Medicine  Discharge Summary    NAME: Felicia Smith  :  1940  MRN:  [id number]  PCP:Mike Nunez DO  ADMITTED: 2024      DISCHARGED: 24    ADMITTING PHYSICIAN: Mateusz Smith MD    CONSULTANT(S):   IP CONSULT TO HOSPITALIST  IP CONSULT TO CARDIOLOGY     ADMITTING DIAGNOSIS:   Fractured hip, right, closed, initial encounter ([facility]) [S72.001A]     DISCHARGE DIAGNOSES:   Right nondisplaced intertrochanteric femur fracture secondary to mechanical fall at home status post open reduction internal fixation 2024  Chronic kidney disease stage III AA  Essential hypertension  History of nonsustained ventricular tachycardia  Chronic, compensated diastolic congestive heart failure with preserved ejection fraction of 65 to 70% with mild valvular heart disease  History of thromboembolic disease (antiphospholipid syndrome, ? factor V Leiden deficiency) including pulmonary embolism previously on Coumadin therapy with discontinuation several years back secondary to a multitude of issues  History of colon cancer  Gastroesophageal reflux disease    BRIEF HISTORY OF PRESENT ILLNESS:   This is a pleasant 84-year-old white female who was admitted to [facility]. The patient was transferred here under the service of Dr. Smith. The patient states she apparently has been doing relatively well. The patient states approximately a week or 2 ago she was out helping a work in the yard. The patient at that time apparently was doing some work in the yard. She did fall, sustaining pain in her right hip area. The patient has been unable to ambulate

## 2024-08-28 DIAGNOSIS — S72.141A CLOSED INTERTROCHANTERIC FRACTURE OF HIP, RIGHT, INITIAL ENCOUNTER (HCC): Primary | ICD-10-CM

## 2024-08-29 LAB
EKG ATRIAL RATE: 63 BPM
EKG P AXIS: 45 DEGREES
EKG P-R INTERVAL: 194 MS
EKG Q-T INTERVAL: 432 MS
EKG QRS DURATION: 100 MS
EKG QTC CALCULATION (BAZETT): 442 MS
EKG R AXIS: -35 DEGREES
EKG T AXIS: 58 DEGREES
EKG VENTRICULAR RATE: 63 BPM

## 2024-09-06 ENCOUNTER — TELEPHONE (OUTPATIENT)
Dept: ORTHOPEDIC SURGERY | Age: 84
End: 2024-09-06

## 2024-09-06 NOTE — TELEPHONE ENCOUNTER
Returned call to daughter listed in patients chart. Left message. Dr. Smith will see the knee during her hip appointment.

## 2024-09-06 NOTE — TELEPHONE ENCOUNTER
Pt's daughter called in the pt is experiencing RT knee pain, she is already david on 9/10/24 for a post on the RT hip, she wants her knee looked at the same. I advised her since its a different DX, I would have to david a different appt, she is not happy with that but david on 9/12/24 for her RT knee but would still like to know if doctor will treat her knee on 9/10/24? Pt thinks the Rt knee pain is related to the hip pain.

## 2024-09-10 ENCOUNTER — OFFICE VISIT (OUTPATIENT)
Dept: ORTHOPEDIC SURGERY | Age: 84
End: 2024-09-10

## 2024-09-10 VITALS — HEIGHT: 63 IN | BODY MASS INDEX: 21.26 KG/M2 | WEIGHT: 120 LBS

## 2024-09-10 DIAGNOSIS — M25.561 RIGHT KNEE PAIN, UNSPECIFIED CHRONICITY: ICD-10-CM

## 2024-09-10 DIAGNOSIS — M17.11 PRIMARY OSTEOARTHRITIS OF RIGHT KNEE: ICD-10-CM

## 2024-09-10 DIAGNOSIS — M25.361 INSTABILITY OF RIGHT KNEE JOINT: ICD-10-CM

## 2024-09-10 DIAGNOSIS — Z47.89 ORTHOPEDIC AFTERCARE: Primary | ICD-10-CM

## 2024-09-10 DIAGNOSIS — S72.141A CLOSED INTERTROCHANTERIC FRACTURE OF HIP, RIGHT, INITIAL ENCOUNTER (HCC): Primary | ICD-10-CM

## 2024-09-10 PROCEDURE — 99024 POSTOP FOLLOW-UP VISIT: CPT | Performed by: ORTHOPAEDIC SURGERY

## 2024-10-17 DIAGNOSIS — Z96.641 S/P TOTAL RIGHT HIP ARTHROPLASTY: Primary | ICD-10-CM

## 2024-10-22 ENCOUNTER — OFFICE VISIT (OUTPATIENT)
Dept: ORTHOPEDIC SURGERY | Age: 84
End: 2024-10-22

## 2024-10-22 VITALS — HEIGHT: 63 IN | WEIGHT: 120 LBS | TEMPERATURE: 98 F | BODY MASS INDEX: 21.26 KG/M2

## 2024-10-22 DIAGNOSIS — Z47.89 ORTHOPEDIC AFTERCARE: Primary | ICD-10-CM

## 2024-10-22 PROCEDURE — 99024 POSTOP FOLLOW-UP VISIT: CPT | Performed by: ORTHOPAEDIC SURGERY

## 2024-10-22 NOTE — PROGRESS NOTES
8 Week Post op R hip ORIF    Subjective: The patient is making good progress after R hip ORIF surgery. Activity is progressing and normal postoperative discomfort is improving as expected. No pain. No signs or symptoms of DVT or infection.     Physical Exam: The right hip incisions are healing well with no evidence of infection. No drainage or erythema is noted. Peroneal and tibial nerve function is intact. The foot is well perfused.     Xrays:  2 views R hip - well aligned fracture and implants without signs of failure    Assessment: 8 weeks s/p R hip ORIF    Plan:   The patient was encouraged to continue with the postoperative physical therapy program focusing on range of motion, muscle strengthening and gait mechanics.   Weight-bearing: WBAT  Discontinue chemoprophylaxis at 1 month.   Signs/symptoms of DVT/PE were reviewed.   Warning signs of infection were discussed.   Pain management strategies were reviewed including ice, elevation, and rest. The importance of weaning from narcotic pain medication over time was discussed and emphasized.     Follow up: PRN    The patient's questions were addressed as completely as possible. the patient will contact us if function decreases, pain increases or if they have any other concerns or questions.

## 2025-06-03 ENCOUNTER — TELEPHONE (OUTPATIENT)
Dept: ORTHOPEDIC SURGERY | Age: 85
End: 2025-06-03

## 2025-06-03 NOTE — TELEPHONE ENCOUNTER
Incoming referral from Dr. Nunez to Dr. Smith- bilateral knee.    Left message on patient voicemail with scheduling instructions and office number for return calls.   May schedule npv knee appt for bilateral knees at patient convenience.     Referral attached.

## 2025-06-04 DIAGNOSIS — M25.561 PAIN IN BOTH KNEES, UNSPECIFIED CHRONICITY: Primary | ICD-10-CM

## 2025-06-04 DIAGNOSIS — M25.562 PAIN IN BOTH KNEES, UNSPECIFIED CHRONICITY: Primary | ICD-10-CM

## 2025-06-05 ENCOUNTER — OFFICE VISIT (OUTPATIENT)
Dept: ORTHOPEDIC SURGERY | Age: 85
End: 2025-06-05

## 2025-06-05 VITALS — TEMPERATURE: 98.6 F | BODY MASS INDEX: 22.08 KG/M2 | HEIGHT: 62 IN | WEIGHT: 120 LBS

## 2025-06-05 DIAGNOSIS — M17.0 BILATERAL PRIMARY OSTEOARTHRITIS OF KNEE: Primary | ICD-10-CM

## 2025-06-05 DIAGNOSIS — M75.121 NONTRAUMATIC COMPLETE TEAR OF RIGHT ROTATOR CUFF: ICD-10-CM

## 2025-06-05 RX ORDER — DONEPEZIL HYDROCHLORIDE 5 MG/1
5 TABLET, FILM COATED ORAL NIGHTLY
COMMUNITY
Start: 2025-05-28

## 2025-06-05 RX ORDER — TRIAMCINOLONE ACETONIDE 40 MG/ML
80 INJECTION, SUSPENSION INTRA-ARTICULAR; INTRAMUSCULAR ONCE
Status: COMPLETED | OUTPATIENT
Start: 2025-06-05 | End: 2025-06-05

## 2025-06-05 RX ADMIN — TRIAMCINOLONE ACETONIDE 80 MG: 40 INJECTION, SUSPENSION INTRA-ARTICULAR; INTRAMUSCULAR at 08:31

## 2025-06-05 RX ADMIN — TRIAMCINOLONE ACETONIDE 80 MG: 40 INJECTION, SUSPENSION INTRA-ARTICULAR; INTRAMUSCULAR at 08:33

## 2025-06-05 ASSESSMENT — ENCOUNTER SYMPTOMS
EYE DISCHARGE: 0
ALLERGIC/IMMUNOLOGIC NEGATIVE: 1
SHORTNESS OF BREATH: 0
ABDOMINAL DISTENTION: 0

## 2025-06-05 NOTE — PROGRESS NOTES
Felicia Smith (:  1940) is a 85 y.o. female,Established patient, here for evaluation of the following chief complaint(s):  Knee Pain (Bilateral knee pain. Pain began weeks ago. Pain is intermittent. Left knee is worse than right. Having some discomfort today. Patient would like to discuss the left shoulder as well. )      Assessment & Plan   ASSESSMENT/PLAN:  1. Bilateral primary osteoarthritis of knee  -     triamcinolone acetonide (KENALOG-40) injection 80 mg; 80 mg, Intra-artICUlar, ONCE, 1 dose, On u 25 at 0845  2. Nontraumatic complete tear of right rotator cuff  -     triamcinolone acetonide (KENALOG-40) injection 80 mg; 80 mg, Intra-artICUlar, ONCE, 1 dose, On u 25 at 0845    This is a 85 y.o. year old female with Bilateral primary osteoarthritis of knee [M17.0] and right rotator cuff tear  I discussed a variety of treatment options with the patient today including observation, NSAID, low impact exercise, weight loss, physical therapy and injections. I also discussed the risks, benefits, alternatives and subsequent rehab with surgery. The patient would like to proceed with injection in both R shoulder and L knee.    HEP provided  OTC NSAIDs/Tylenol    We discussed the risks and benefits of a corticosteroid injection in the left knee and right subacromial space. The patient would like to proceed and consents to the procedure. The patient should let us know if they develop any signs of infection, worsening pain or other problems. I discussed if they have diabetes that they should closely monitor their blood glucose level over the next few days.    The left knee was identified and prepped sterilely. Using 2 cc of 40 mg Kenalog and 4 cc of 0.25% bupivacaine the left knee was injected without issue. This was via a inferomedial portal. The patient tolerated this procedure well and a Band-Aid was placed.    The right subacromial space was identified and prepped sterilely. Using 2 cc of 40 mg

## (undated) DEVICE — TOWEL,OR,DSP,ST,BLUE,STD,6/PK,12PK/CS: Brand: MEDLINE

## (undated) DEVICE — GOWN ISOLATN REG YEL M WT MULTIPLY SIDETIE LEV 2

## (undated) DEVICE — 6 X 9  1.75MIL 4-WALL LABGUARD: Brand: MINIGRIP COMMERCIAL LLC

## (undated) DEVICE — MASK,FACE,MAXFLUIDPROTECT,SHIELD/ERLPS: Brand: MEDLINE

## (undated) DEVICE — SINGLE-USE POLYPECTOMY SNARE: Brand: CAPTIVATOR

## (undated) DEVICE — COVER,LIGHT HANDLE,FLX,1/PK: Brand: MEDLINE INDUSTRIES, INC.

## (undated) DEVICE — LOCKING SCREW, FULLY THREADED
Type: IMPLANTABLE DEVICE | Site: HIP | Status: NON-FUNCTIONAL
Removed: 2024-08-25

## (undated) DEVICE — Device: Brand: DEFENDO VALVE AND CONNECTOR KIT

## (undated) DEVICE — TUBING, SUCTION, 1/4" X 10', STRAIGHT: Brand: MEDLINE

## (undated) DEVICE — NDL CNTR 40CT FM MAG: Brand: MEDLINE INDUSTRIES, INC.

## (undated) DEVICE — FORCEPS BX L240CM JAW DIA2.8MM L CAP W/ NDL MIC MESH TOOTH

## (undated) DEVICE — KENDALL 450 SERIES MONITORING FOAM ELECTRODE - RECTANGULAR SHAPE ( 3/PK): Brand: KENDALL

## (undated) DEVICE — CONTAINER SPEC COLL 960ML POLYPR TRIANG GRAD INTAKE/OUTPUT

## (undated) DEVICE — TRAP SURG QUAD PARABOLA SLOT DSGN SFTY SCRN TRAPEASE

## (undated) DEVICE — DRILL, AO SMALL: Brand: GAMMA

## (undated) DEVICE — KIT BEDSIDE REVITAL OX 500ML

## (undated) DEVICE — BLADE,STAINLESS-STEEL,10,STRL,DISPOSABLE: Brand: MEDLINE

## (undated) DEVICE — YANKAUER,BULB TIP,W/O VENT,RIGID,STERILE: Brand: MEDLINE

## (undated) DEVICE — LUBRICANT SURG JELLY ST BACTER TUBE 4.25OZ

## (undated) DEVICE — MARKER,SKIN,WI/RULER AND LABELS: Brand: MEDLINE

## (undated) DEVICE — SPONGE GZ 4IN 4IN 4 PLY N WVN AVANT

## (undated) DEVICE — GLOVE SURG SZ 7 L12IN FNGR THK79MIL GRN LTX FREE

## (undated) DEVICE — APPLICATOR MEDICATED 26 CC SOLUTION HI LT ORNG CHLORAPREP

## (undated) DEVICE — DRAPE C ARM W41XL65IN UNIV W/ CLP AND RUBBERBAND

## (undated) DEVICE — GOWN,SIRUS,POLYRNF,RAGLAN,XL,ST,30/CS: Brand: MEDLINE

## (undated) DEVICE — SYRINGE IRRIG 60ML SFT PLIABLE BLB EZ TO GRP 1 HND USE W/

## (undated) DEVICE — ELECTRODE PT RET AD L9FT HI MOIST COND ADH HYDRGEL CORDED

## (undated) DEVICE — SYRINGE MED 50ML LUERSLIP TIP

## (undated) DEVICE — 6617 IOBAN II PATIENT ISOLATION DRAPE 5/BX,4BX/CS: Brand: STERI-DRAPE™ IOBAN™ 2

## (undated) DEVICE — GAUZE,SPONGE,4"X4",16PLY,STRL,LF,10/TRAY: Brand: MEDLINE

## (undated) DEVICE — DRESSING,GAUZE,XEROFORM,CURAD,5"X9",ST: Brand: CURAD

## (undated) DEVICE — BLOCK BITE 60FR CAREGUARD

## (undated) DEVICE — 4-PORT MANIFOLD: Brand: NEPTUNE 2

## (undated) DEVICE — GLOVE ORANGE PI 7   MSG9070

## (undated) DEVICE — DISPOSABLE DISTAL ATTACHMENT: Brand: DISPOSABLE DISTAL ATTACHMENT

## (undated) DEVICE — BASIC DOUBLE BASIN 2-LF: Brand: MEDLINE INDUSTRIES, INC.

## (undated) DEVICE — WIPES SKIN CLOTH READYPREP 9 X 10.5 IN 2% CHLORHEX GLUCONATE CHG PREOP

## (undated) DEVICE — Device

## (undated) DEVICE — BANDAGE NL COFLEX 4INX5YD: Brand: MEDLINE INDUSTRIES, INC.

## (undated) DEVICE — PACK,BASIC I: Brand: MEDLINE

## (undated) DEVICE — GLOVE ORTHO 7   MSG9470

## (undated) DEVICE — CONTAINER SPEC 480ML CLR POLYSTYR 10% NEUT BUFF FRMLN ZN

## (undated) DEVICE — PAD,ABDOMINAL,5"X9",ST,LF,25/BX: Brand: MEDLINE INDUSTRIES, INC.